# Patient Record
Sex: MALE | Race: WHITE | Employment: OTHER | URBAN - METROPOLITAN AREA
[De-identification: names, ages, dates, MRNs, and addresses within clinical notes are randomized per-mention and may not be internally consistent; named-entity substitution may affect disease eponyms.]

---

## 2017-01-01 ENCOUNTER — APPOINTMENT (OUTPATIENT)
Dept: CT IMAGING | Age: 77
DRG: 698 | End: 2017-01-01
Attending: EMERGENCY MEDICINE
Payer: MEDICARE

## 2017-01-01 ENCOUNTER — HOSPITAL ENCOUNTER (OUTPATIENT)
Dept: WOUND CARE | Age: 77
Discharge: HOME | End: 2017-05-15
Attending: PHYSICAL MEDICINE & REHABILITATION
Payer: MEDICARE

## 2017-01-01 ENCOUNTER — APPOINTMENT (OUTPATIENT)
Dept: INTERVENTIONAL RADIOLOGY/VASCULAR | Age: 77
DRG: 698 | End: 2017-01-01
Attending: INTERNAL MEDICINE
Payer: MEDICARE

## 2017-01-01 ENCOUNTER — HOME CARE VISIT (OUTPATIENT)
Dept: SCHEDULING | Facility: HOME HEALTH | Age: 77
End: 2017-01-01

## 2017-01-01 ENCOUNTER — APPOINTMENT (OUTPATIENT)
Dept: ULTRASOUND IMAGING | Age: 77
DRG: 698 | End: 2017-01-01
Attending: HOSPITALIST
Payer: MEDICARE

## 2017-01-01 ENCOUNTER — APPOINTMENT (OUTPATIENT)
Dept: GENERAL RADIOLOGY | Age: 77
DRG: 208 | End: 2017-01-01
Attending: INTERNAL MEDICINE
Payer: MEDICARE

## 2017-01-01 ENCOUNTER — APPOINTMENT (OUTPATIENT)
Dept: GENERAL RADIOLOGY | Age: 77
DRG: 208 | End: 2017-01-01
Attending: EMERGENCY MEDICINE
Payer: MEDICARE

## 2017-01-01 ENCOUNTER — HOSPITAL ENCOUNTER (INPATIENT)
Age: 77
LOS: 1 days | DRG: 208 | End: 2017-06-06
Attending: EMERGENCY MEDICINE | Admitting: INTERNAL MEDICINE
Payer: MEDICARE

## 2017-01-01 ENCOUNTER — HOSPITAL ENCOUNTER (INPATIENT)
Age: 77
LOS: 5 days | Discharge: HOME HEALTH | DRG: 698 | End: 2017-06-02
Attending: EMERGENCY MEDICINE | Admitting: HOSPITALIST
Payer: MEDICARE

## 2017-01-01 ENCOUNTER — APPOINTMENT (OUTPATIENT)
Dept: GENERAL RADIOLOGY | Age: 77
DRG: 698 | End: 2017-01-01
Attending: EMERGENCY MEDICINE
Payer: MEDICARE

## 2017-01-01 ENCOUNTER — HOME HEALTH ADMISSION (OUTPATIENT)
Dept: HOME HEALTH SERVICES | Facility: HOME HEALTH | Age: 77
End: 2017-01-01

## 2017-01-01 VITALS
OXYGEN SATURATION: 93 % | SYSTOLIC BLOOD PRESSURE: 129 MMHG | RESPIRATION RATE: 16 BRPM | TEMPERATURE: 97.5 F | HEIGHT: 71 IN | BODY MASS INDEX: 44.1 KG/M2 | DIASTOLIC BLOOD PRESSURE: 60 MMHG | WEIGHT: 315 LBS | HEART RATE: 76 BPM

## 2017-01-01 VITALS
BODY MASS INDEX: 44.1 KG/M2 | DIASTOLIC BLOOD PRESSURE: 49 MMHG | OXYGEN SATURATION: 97 % | RESPIRATION RATE: 22 BRPM | SYSTOLIC BLOOD PRESSURE: 78 MMHG | TEMPERATURE: 99.7 F | WEIGHT: 315 LBS | HEART RATE: 64 BPM | HEIGHT: 71 IN

## 2017-01-01 DIAGNOSIS — R65.21 SEVERE SEPSIS WITH SEPTIC SHOCK (HCC): ICD-10-CM

## 2017-01-01 DIAGNOSIS — E66.01 MORBID OBESITY DUE TO EXCESS CALORIES (HCC): ICD-10-CM

## 2017-01-01 DIAGNOSIS — J44.9 CHRONIC OBSTRUCTIVE PULMONARY DISEASE, UNSPECIFIED COPD TYPE (HCC): ICD-10-CM

## 2017-01-01 DIAGNOSIS — T17.920D: ICD-10-CM

## 2017-01-01 DIAGNOSIS — G93.1 ANOXIC BRAIN DAMAGE (HCC): ICD-10-CM

## 2017-01-01 DIAGNOSIS — R53.1 WEAKNESS: ICD-10-CM

## 2017-01-01 DIAGNOSIS — Z99.89 OSA ON CPAP: ICD-10-CM

## 2017-01-01 DIAGNOSIS — E11.22 TYPE 2 DIABETES MELLITUS WITH CHRONIC KIDNEY DISEASE ON CHRONIC DIALYSIS, UNSPECIFIED LONG TERM INSULIN USE STATUS: ICD-10-CM

## 2017-01-01 DIAGNOSIS — G93.40 ACUTE ENCEPHALOPATHY: ICD-10-CM

## 2017-01-01 DIAGNOSIS — I46.9 CARDIAC ARREST (HCC): ICD-10-CM

## 2017-01-01 DIAGNOSIS — A41.9 SEVERE SEPSIS WITH SEPTIC SHOCK (HCC): ICD-10-CM

## 2017-01-01 DIAGNOSIS — G47.33 OSA ON CPAP: ICD-10-CM

## 2017-01-01 DIAGNOSIS — J96.02 ACUTE HYPERCAPNIC RESPIRATORY FAILURE (HCC): ICD-10-CM

## 2017-01-01 DIAGNOSIS — N18.6 TYPE 2 DIABETES MELLITUS WITH CHRONIC KIDNEY DISEASE ON CHRONIC DIALYSIS, UNSPECIFIED LONG TERM INSULIN USE STATUS: ICD-10-CM

## 2017-01-01 DIAGNOSIS — T17.900D: ICD-10-CM

## 2017-01-01 DIAGNOSIS — Z99.2 TYPE 2 DIABETES MELLITUS WITH CHRONIC KIDNEY DISEASE ON CHRONIC DIALYSIS, UNSPECIFIED LONG TERM INSULIN USE STATUS: ICD-10-CM

## 2017-01-01 DIAGNOSIS — N18.6 ESRD ON HEMODIALYSIS (HCC): ICD-10-CM

## 2017-01-01 DIAGNOSIS — Z99.2 ESRD ON HEMODIALYSIS (HCC): ICD-10-CM

## 2017-01-01 DIAGNOSIS — E87.5 ACUTE HYPERKALEMIA: ICD-10-CM

## 2017-01-01 DIAGNOSIS — N17.9 ACUTE RENAL FAILURE, UNSPECIFIED ACUTE RENAL FAILURE TYPE (HCC): Primary | ICD-10-CM

## 2017-01-01 DIAGNOSIS — T17.920A: ICD-10-CM

## 2017-01-01 DIAGNOSIS — R09.2: Primary | ICD-10-CM

## 2017-01-01 LAB
ALBUMIN SERPL BCP-MCNC: 2.3 G/DL (ref 3.2–4.6)
ALBUMIN SERPL BCP-MCNC: 2.3 G/DL (ref 3.2–4.6)
ALBUMIN SERPL BCP-MCNC: 2.6 G/DL (ref 3.2–4.6)
ALBUMIN SERPL BCP-MCNC: 2.8 G/DL (ref 3.2–4.6)
ALBUMIN/GLOB SERPL: 0.5 {RATIO} (ref 1.2–3.5)
ALBUMIN/GLOB SERPL: 0.5 {RATIO} (ref 1.2–3.5)
ALBUMIN/GLOB SERPL: 0.6 {RATIO} (ref 1.2–3.5)
ALP SERPL-CCNC: 107 U/L (ref 50–136)
ALP SERPL-CCNC: 79 U/L (ref 50–136)
ALP SERPL-CCNC: 91 U/L (ref 50–136)
ALT SERPL-CCNC: 23 U/L (ref 12–65)
ALT SERPL-CCNC: 28 U/L (ref 12–65)
ALT SERPL-CCNC: 30 U/L (ref 12–65)
AMMONIA PLAS-SCNC: 24 UMOL/L (ref 11–32)
AMORPH CRY URNS QL MICRO: ABNORMAL
ANION GAP BLD CALC-SCNC: 12 MMOL/L (ref 7–16)
ANION GAP BLD CALC-SCNC: 13 MMOL/L (ref 7–16)
ANION GAP BLD CALC-SCNC: 14 MMOL/L (ref 7–16)
ANION GAP BLD CALC-SCNC: 14 MMOL/L (ref 7–16)
ANION GAP BLD CALC-SCNC: 16 MMOL/L (ref 7–16)
ANION GAP BLD CALC-SCNC: 9 MMOL/L (ref 7–16)
APPEARANCE UR: ABNORMAL
ARTERIAL PATENCY WRIST A: ABNORMAL
ARTERIAL PATENCY WRIST A: ABNORMAL
ARTERIAL PATENCY WRIST A: POSITIVE
AST SERPL W P-5'-P-CCNC: 10 U/L (ref 15–37)
AST SERPL W P-5'-P-CCNC: 25 U/L (ref 15–37)
AST SERPL W P-5'-P-CCNC: 42 U/L (ref 15–37)
ATRIAL RATE: 105 BPM
ATRIAL RATE: 312 BPM
BACTERIA SPEC CULT: NORMAL
BACTERIA URNS QL MICRO: 0 /HPF
BASE DEFICIT BLDA-SCNC: 2.2 MMOL/L (ref 0–2)
BASE DEFICIT BLDA-SCNC: 3.3 MMOL/L (ref 0–2)
BASE DEFICIT BLDA-SCNC: 5.9 MMOL/L (ref 0–2)
BASOPHILS # BLD AUTO: 0 K/UL (ref 0–0.2)
BASOPHILS # BLD AUTO: 0 K/UL (ref 0–0.2)
BASOPHILS # BLD AUTO: 0.2 K/UL (ref 0–0.2)
BASOPHILS # BLD: 0 % (ref 0–2)
BASOPHILS # BLD: 0 % (ref 0–2)
BASOPHILS NFR BLD MANUAL: 1 % (ref 0–2)
BDY SITE: ABNORMAL
BILIRUB SERPL-MCNC: 0.3 MG/DL (ref 0.2–1.1)
BILIRUB SERPL-MCNC: 0.4 MG/DL (ref 0.2–1.1)
BILIRUB SERPL-MCNC: 0.5 MG/DL (ref 0.2–1.1)
BILIRUB UR QL: NEGATIVE
BNP SERPL-MCNC: 422 PG/ML
BUN SERPL-MCNC: 114 MG/DL (ref 8–23)
BUN SERPL-MCNC: 135 MG/DL (ref 8–23)
BUN SERPL-MCNC: 139 MG/DL (ref 8–23)
BUN SERPL-MCNC: 41 MG/DL (ref 8–23)
BUN SERPL-MCNC: 43 MG/DL (ref 8–23)
BUN SERPL-MCNC: 49 MG/DL (ref 8–23)
CA-I BLD-SCNC: 1.12 MMOL/L (ref 1–1.3)
CALCIUM SERPL-MCNC: 7.4 MG/DL (ref 8.3–10.4)
CALCIUM SERPL-MCNC: 7.4 MG/DL (ref 8.3–10.4)
CALCIUM SERPL-MCNC: 7.6 MG/DL (ref 8.3–10.4)
CALCIUM SERPL-MCNC: 8.2 MG/DL (ref 8.3–10.4)
CALCIUM SERPL-MCNC: 8.3 MG/DL (ref 8.3–10.4)
CALCIUM SERPL-MCNC: 8.4 MG/DL (ref 8.3–10.4)
CALCULATED R AXIS, ECG10: 28 DEGREES
CALCULATED R AXIS, ECG10: 9 DEGREES
CALCULATED T AXIS, ECG11: -133 DEGREES
CALCULATED T AXIS, ECG11: -152 DEGREES
CHLORIDE BLDA-SCNC: 93 MMOL/L (ref 98–106)
CHLORIDE SERPL-SCNC: 92 MMOL/L (ref 98–107)
CHLORIDE SERPL-SCNC: 93 MMOL/L (ref 98–107)
CHLORIDE SERPL-SCNC: 94 MMOL/L (ref 98–107)
CHLORIDE SERPL-SCNC: 95 MMOL/L (ref 98–107)
CHLORIDE SERPL-SCNC: 95 MMOL/L (ref 98–107)
CHLORIDE SERPL-SCNC: 98 MMOL/L (ref 98–107)
CO2 SERPL-SCNC: 23 MMOL/L (ref 21–32)
CO2 SERPL-SCNC: 25 MMOL/L (ref 21–32)
CO2 SERPL-SCNC: 26 MMOL/L (ref 21–32)
CO2 SERPL-SCNC: 26 MMOL/L (ref 21–32)
CO2 SERPL-SCNC: 27 MMOL/L (ref 21–32)
CO2 SERPL-SCNC: 29 MMOL/L (ref 21–32)
COHGB MFR BLD: 0.4 % (ref 0.5–1.5)
COHGB MFR BLD: 0.5 % (ref 0.5–1.5)
COHGB MFR BLD: 0.9 % (ref 0.5–1.5)
COLOR UR: YELLOW
CREAT SERPL-MCNC: 3.81 MG/DL (ref 0.8–1.5)
CREAT SERPL-MCNC: 4.38 MG/DL (ref 0.8–1.5)
CREAT SERPL-MCNC: 4.45 MG/DL (ref 0.8–1.5)
CREAT SERPL-MCNC: 6.92 MG/DL (ref 0.8–1.5)
CREAT SERPL-MCNC: 8.18 MG/DL (ref 0.8–1.5)
CREAT SERPL-MCNC: 8.25 MG/DL (ref 0.8–1.5)
DIAGNOSIS, 93000: NORMAL
DIAGNOSIS, 93000: NORMAL
DIFFERENTIAL METHOD BLD: ABNORMAL
DO-HGB BLD-MCNC: 1 % (ref 0–5)
DO-HGB BLD-MCNC: 1 % (ref 0–5)
DO-HGB BLD-MCNC: 6 % (ref 0–5)
EOSINOPHIL # BLD: 0 K/UL (ref 0–0.8)
EOSINOPHIL # BLD: 0.1 K/UL (ref 0–0.8)
EOSINOPHIL # BLD: 0.2 K/UL (ref 0–0.8)
EOSINOPHIL NFR BLD MANUAL: 1 % (ref 1–8)
EOSINOPHIL NFR BLD: 0 % (ref 0.5–7.8)
EOSINOPHIL NFR BLD: 1 % (ref 0.5–7.8)
ERYTHROCYTE [DISTWIDTH] IN BLOOD BY AUTOMATED COUNT: 13.4 % (ref 11.9–14.6)
ERYTHROCYTE [DISTWIDTH] IN BLOOD BY AUTOMATED COUNT: 13.5 % (ref 11.9–14.6)
ERYTHROCYTE [DISTWIDTH] IN BLOOD BY AUTOMATED COUNT: 14.1 % (ref 11.9–14.6)
ERYTHROCYTE [DISTWIDTH] IN BLOOD BY AUTOMATED COUNT: 14.2 % (ref 11.9–14.6)
FERRITIN SERPL-MCNC: 175 NG/ML (ref 8–388)
FIO2 ON VENT: 100 %
FIO2 ON VENT: 100 %
FIO2 ON VENT: 35 %
GLOBULIN SER CALC-MCNC: 4.3 G/DL (ref 2.3–3.5)
GLOBULIN SER CALC-MCNC: 4.5 G/DL (ref 2.3–3.5)
GLOBULIN SER CALC-MCNC: 4.6 G/DL (ref 2.3–3.5)
GLUCOSE BLD STRIP.AUTO-MCNC: 116 MG/DL (ref 65–100)
GLUCOSE BLD STRIP.AUTO-MCNC: 130 MG/DL (ref 65–100)
GLUCOSE BLD STRIP.AUTO-MCNC: 139 MG/DL (ref 65–100)
GLUCOSE BLD STRIP.AUTO-MCNC: 143 MG/DL (ref 65–100)
GLUCOSE BLD STRIP.AUTO-MCNC: 144 MG/DL (ref 65–100)
GLUCOSE BLD STRIP.AUTO-MCNC: 149 MG/DL (ref 65–100)
GLUCOSE BLD STRIP.AUTO-MCNC: 156 MG/DL (ref 65–100)
GLUCOSE BLD STRIP.AUTO-MCNC: 158 MG/DL (ref 65–100)
GLUCOSE BLD STRIP.AUTO-MCNC: 159 MG/DL (ref 65–100)
GLUCOSE BLD STRIP.AUTO-MCNC: 177 MG/DL (ref 65–100)
GLUCOSE BLD STRIP.AUTO-MCNC: 184 MG/DL (ref 65–100)
GLUCOSE BLD STRIP.AUTO-MCNC: 184 MG/DL (ref 65–100)
GLUCOSE BLD STRIP.AUTO-MCNC: 185 MG/DL (ref 65–100)
GLUCOSE BLD STRIP.AUTO-MCNC: 192 MG/DL (ref 65–100)
GLUCOSE BLD STRIP.AUTO-MCNC: 192 MG/DL (ref 65–100)
GLUCOSE BLD STRIP.AUTO-MCNC: 193 MG/DL (ref 65–100)
GLUCOSE BLD STRIP.AUTO-MCNC: 197 MG/DL (ref 65–100)
GLUCOSE BLD STRIP.AUTO-MCNC: 202 MG/DL (ref 65–100)
GLUCOSE BLD STRIP.AUTO-MCNC: 209 MG/DL (ref 65–100)
GLUCOSE BLD STRIP.AUTO-MCNC: 210 MG/DL (ref 65–100)
GLUCOSE BLD STRIP.AUTO-MCNC: 239 MG/DL (ref 65–100)
GLUCOSE BLD STRIP.AUTO-MCNC: 243 MG/DL (ref 65–100)
GLUCOSE BLD STRIP.AUTO-MCNC: 247 MG/DL (ref 65–100)
GLUCOSE BLD STRIP.AUTO-MCNC: 266 MG/DL (ref 65–100)
GLUCOSE BLD STRIP.AUTO-MCNC: 317 MG/DL (ref 65–100)
GLUCOSE SERPL-MCNC: 107 MG/DL (ref 65–100)
GLUCOSE SERPL-MCNC: 132 MG/DL (ref 65–100)
GLUCOSE SERPL-MCNC: 170 MG/DL (ref 65–100)
GLUCOSE SERPL-MCNC: 187 MG/DL (ref 65–100)
GLUCOSE SERPL-MCNC: 205 MG/DL (ref 65–100)
GLUCOSE SERPL-MCNC: 218 MG/DL (ref 65–100)
GLUCOSE UR STRIP.AUTO-MCNC: 100 MG/DL
HBA1C MFR BLD: 6.3 %
HBV SURFACE AG SERPL QL IA: NEGATIVE
HCO3 BLDA-SCNC: 22 MMOL/L (ref 22–26)
HCO3 BLDA-SCNC: 23 MMOL/L (ref 22–26)
HCO3 BLDA-SCNC: 24 MMOL/L (ref 22–26)
HCT VFR BLD AUTO: 24.4 % (ref 41.1–50.3)
HCT VFR BLD AUTO: 25 % (ref 41.1–50.3)
HCT VFR BLD AUTO: 25.2 % (ref 41.1–50.3)
HCT VFR BLD AUTO: 25.7 % (ref 41.1–50.3)
HCT VFR BLD AUTO: 25.9 % (ref 41.1–50.3)
HGB BLD-MCNC: 7.6 G/DL (ref 13.6–17.2)
HGB BLD-MCNC: 7.8 G/DL (ref 13.6–17.2)
HGB BLD-MCNC: 7.9 G/DL (ref 13.6–17.2)
HGB BLD-MCNC: 8 G/DL (ref 13.6–17.2)
HGB BLD-MCNC: 8.2 G/DL (ref 13.6–17.2)
HGB BLDMV-MCNC: 8.2 GM/DL (ref 11.7–15)
HGB BLDMV-MCNC: 8.5 GM/DL (ref 11.7–15)
HGB BLDMV-MCNC: 8.7 GM/DL (ref 11.7–15)
HGB UR QL STRIP: NEGATIVE
IMM GRANULOCYTES # BLD: 0 K/UL (ref 0–0.5)
IMM GRANULOCYTES # BLD: 0.3 K/UL (ref 0–0.5)
IMM GRANULOCYTES NFR BLD AUTO: 0.6 % (ref 0–5)
IMM GRANULOCYTES NFR BLD AUTO: 1.3 % (ref 0–5)
IRON SATN MFR SERPL: 22 %
IRON SERPL-MCNC: 42 UG/DL (ref 35–150)
KETONES UR QL STRIP.AUTO: NEGATIVE MG/DL
LACTATE BLD-SCNC: 1.7 MMOL/L (ref 0.5–1.9)
LACTATE BLD-SCNC: 5.7 MMOL/L (ref 0.5–1.9)
LACTATE SERPL-SCNC: 1.3 MMOL/L (ref 0.4–2)
LEUKOCYTE ESTERASE UR QL STRIP.AUTO: ABNORMAL
LIPASE SERPL-CCNC: 101 U/L (ref 73–393)
LYMPHOCYTES # BLD AUTO: 5 % (ref 13–44)
LYMPHOCYTES # BLD AUTO: 9 % (ref 13–44)
LYMPHOCYTES # BLD: 0.6 K/UL (ref 0.5–4.6)
LYMPHOCYTES # BLD: 1.3 K/UL (ref 0.5–4.6)
LYMPHOCYTES # BLD: 4.1 K/UL (ref 0.5–4.6)
LYMPHOCYTES NFR BLD MANUAL: 18 % (ref 16–44)
MAGNESIUM SERPL-MCNC: 2.1 MG/DL (ref 1.8–2.4)
MAGNESIUM SERPL-MCNC: 2.3 MG/DL (ref 1.8–2.4)
MCH RBC QN AUTO: 29.9 PG (ref 26.1–32.9)
MCH RBC QN AUTO: 29.9 PG (ref 26.1–32.9)
MCH RBC QN AUTO: 30 PG (ref 26.1–32.9)
MCH RBC QN AUTO: 30.3 PG (ref 26.1–32.9)
MCHC RBC AUTO-ENTMCNC: 30.7 G/DL (ref 31.4–35)
MCHC RBC AUTO-ENTMCNC: 31.1 G/DL (ref 31.4–35)
MCHC RBC AUTO-ENTMCNC: 31.2 G/DL (ref 31.4–35)
MCHC RBC AUTO-ENTMCNC: 31.7 G/DL (ref 31.4–35)
MCV RBC AUTO: 94.9 FL (ref 79.6–97.8)
MCV RBC AUTO: 95.8 FL (ref 79.6–97.8)
MCV RBC AUTO: 96.1 FL (ref 79.6–97.8)
MCV RBC AUTO: 98.5 FL (ref 79.6–97.8)
METHGB MFR BLD: 0.3 % (ref 0–1.5)
METHGB MFR BLD: 0.7 % (ref 0–1.5)
METHGB MFR BLD: 0.7 % (ref 0–1.5)
MM INDURATION POC: NORMAL 0MM (ref 0–5)
MM INDURATION POC: NORMAL MM (ref 0–5)
MM INDURATION POC: NORMAL MM (ref 0–5)
MONOCYTES # BLD: 0.6 K/UL (ref 0.1–1.3)
MONOCYTES # BLD: 1.7 K/UL (ref 0.1–1.3)
MONOCYTES # BLD: 2 K/UL (ref 0.1–1.3)
MONOCYTES NFR BLD AUTO: 6 % (ref 4–12)
MONOCYTES NFR BLD AUTO: 9 % (ref 4–12)
MONOCYTES NFR BLD MANUAL: 9 % (ref 3–9)
NEUTS SEG # BLD: 16.2 K/UL (ref 1.7–8.2)
NEUTS SEG # BLD: 22.9 K/UL (ref 1.7–8.2)
NEUTS SEG # BLD: 5.5 K/UL (ref 1.7–8.2)
NEUTS SEG NFR BLD AUTO: 80 % (ref 43–78)
NEUTS SEG NFR BLD AUTO: 88 % (ref 43–78)
NEUTS SEG NFR BLD MANUAL: 71 % (ref 47–75)
NITRITE UR QL STRIP.AUTO: NEGATIVE
OXYHGB MFR BLDA: 92.4 % (ref 94–97)
OXYHGB MFR BLDA: 97.8 % (ref 94–97)
OXYHGB MFR BLDA: 98 % (ref 94–97)
PCO2 BLDA: 45 MMHG (ref 35–45)
PCO2 BLDA: 47 MMHG (ref 35–45)
PCO2 BLDA: 56 MMHG (ref 35–45)
PEEP RESPIRATORY: 10 CM[H2O]
PEEP RESPIRATORY: 10 CM[H2O]
PEEP RESPIRATORY: 8 CM[H2O]
PH BLDA: 7.21 [PH] (ref 7.35–7.45)
PH BLDA: 7.32 [PH] (ref 7.35–7.45)
PH BLDA: 7.33 [PH] (ref 7.35–7.45)
PH UR STRIP: 5 [PH] (ref 5–9)
PHOSPHATE SERPL-MCNC: 4 MG/DL (ref 2.3–3.7)
PHOSPHATE SERPL-MCNC: 4.7 MG/DL (ref 2.3–3.7)
PHOSPHATE SERPL-MCNC: 8.7 MG/DL (ref 2.3–3.7)
PLATELET # BLD AUTO: 212 K/UL (ref 150–450)
PLATELET # BLD AUTO: 224 K/UL (ref 150–450)
PLATELET # BLD AUTO: 240 K/UL (ref 150–450)
PLATELET # BLD AUTO: 266 K/UL (ref 150–450)
PLATELET COMMENTS,PCOM: ADEQUATE
PMV BLD AUTO: 9.4 FL (ref 10.8–14.1)
PMV BLD AUTO: 9.5 FL (ref 10.8–14.1)
PMV BLD AUTO: 9.5 FL (ref 10.8–14.1)
PMV BLD AUTO: 9.9 FL (ref 10.8–14.1)
PO2 BLDA: 184 MMHG (ref 75–100)
PO2 BLDA: 210 MMHG (ref 75–100)
PO2 BLDA: 75 MMHG (ref 75–100)
POTASSIUM BLDA-SCNC: 3.65 MMOL/L (ref 3.5–5.3)
POTASSIUM SERPL-SCNC: 3.8 MMOL/L (ref 3.5–5.1)
POTASSIUM SERPL-SCNC: 4 MMOL/L (ref 3.5–5.1)
POTASSIUM SERPL-SCNC: 4 MMOL/L (ref 3.5–5.1)
POTASSIUM SERPL-SCNC: 4.8 MMOL/L (ref 3.5–5.1)
POTASSIUM SERPL-SCNC: 5 MMOL/L (ref 3.5–5.1)
POTASSIUM SERPL-SCNC: 5.3 MMOL/L (ref 3.5–5.1)
PPD POC: NORMAL NEGATIVE
PROT SERPL-MCNC: 6.6 G/DL (ref 6.3–8.2)
PROT SERPL-MCNC: 6.8 G/DL (ref 6.3–8.2)
PROT SERPL-MCNC: 7.4 G/DL (ref 6.3–8.2)
PROT UR STRIP-MCNC: 100 MG/DL
Q-T INTERVAL, ECG07: 332 MS
Q-T INTERVAL, ECG07: 482 MS
QRS DURATION, ECG06: 102 MS
QRS DURATION, ECG06: 110 MS
QTC CALCULATION (BEZET), ECG08: 392 MS
QTC CALCULATION (BEZET), ECG08: 505 MS
RBC # BLD AUTO: 2.54 M/UL (ref 4.23–5.67)
RBC # BLD AUTO: 2.61 M/UL (ref 4.23–5.67)
RBC # BLD AUTO: 2.61 M/UL (ref 4.23–5.67)
RBC # BLD AUTO: 2.73 M/UL (ref 4.23–5.67)
RBC MORPH BLD: ABNORMAL
RESP RATE: 18
RESP RATE: 20
RESP RATE: 20
SAO2 % BLD: 94 % (ref 92–98.5)
SAO2 % BLD: 99 % (ref 92–98.5)
SAO2 % BLD: 99 % (ref 92–98.5)
SERVICE CMNT-IMP: ABNORMAL
SERVICE CMNT-IMP: ABNORMAL
SERVICE CMNT-IMP: NORMAL
SODIUM BLDA-SCNC: 129.5 MMOL/L (ref 135–148)
SODIUM SERPL-SCNC: 132 MMOL/L (ref 136–145)
SODIUM SERPL-SCNC: 132 MMOL/L (ref 136–145)
SODIUM SERPL-SCNC: 133 MMOL/L (ref 136–145)
SODIUM SERPL-SCNC: 134 MMOL/L (ref 136–145)
SODIUM SERPL-SCNC: 134 MMOL/L (ref 136–145)
SODIUM SERPL-SCNC: 136 MMOL/L (ref 136–145)
SP GR UR REFRACTOMETRY: 1.01 (ref 1–1.02)
TIBC SERPL-MCNC: 187 UG/DL (ref 250–450)
TROPONIN I BLD-MCNC: 0.04 NG/ML (ref 0–0.08)
TROPONIN I BLD-MCNC: 0.05 NG/ML (ref 0–0.08)
UROBILINOGEN UR QL STRIP.AUTO: 0.2 EU/DL (ref 0.2–1)
VENTILATION MODE VENT: ABNORMAL
VENTRICULAR RATE, ECG03: 66 BPM
VENTRICULAR RATE, ECG03: 84 BPM
VT SETTING VENT: 500 ML
VT SETTING VENT: 500 ML
WBC # BLD AUTO: 22.7 K/UL (ref 4.3–11.1)
WBC # BLD AUTO: 26.2 K/UL (ref 4.3–11.1)
WBC # BLD AUTO: 5.7 K/UL (ref 4.3–11.1)
WBC # BLD AUTO: 6.8 K/UL (ref 4.3–11.1)
WBC MORPH BLD: ABNORMAL
WBC URNS QL MICRO: ABNORMAL /HPF

## 2017-01-01 PROCEDURE — 74011250636 HC RX REV CODE- 250/636: Performed by: HOSPITALIST

## 2017-01-01 PROCEDURE — 36415 COLL VENOUS BLD VENIPUNCTURE: CPT | Performed by: HOSPITALIST

## 2017-01-01 PROCEDURE — 82728 ASSAY OF FERRITIN: CPT | Performed by: INTERNAL MEDICINE

## 2017-01-01 PROCEDURE — 74011250637 HC RX REV CODE- 250/637: Performed by: INTERNAL MEDICINE

## 2017-01-01 PROCEDURE — 94640 AIRWAY INHALATION TREATMENT: CPT

## 2017-01-01 PROCEDURE — 74011636637 HC RX REV CODE- 636/637: Performed by: HOSPITALIST

## 2017-01-01 PROCEDURE — 82803 BLOOD GASES ANY COMBINATION: CPT

## 2017-01-01 PROCEDURE — 90935 HEMODIALYSIS ONE EVALUATION: CPT

## 2017-01-01 PROCEDURE — 84484 ASSAY OF TROPONIN QUANT: CPT

## 2017-01-01 PROCEDURE — 74011250636 HC RX REV CODE- 250/636: Performed by: NURSE PRACTITIONER

## 2017-01-01 PROCEDURE — 71010 XR CHEST PORT: CPT

## 2017-01-01 PROCEDURE — 80053 COMPREHEN METABOLIC PANEL: CPT | Performed by: EMERGENCY MEDICINE

## 2017-01-01 PROCEDURE — 74011636637 HC RX REV CODE- 636/637: Performed by: INTERNAL MEDICINE

## 2017-01-01 PROCEDURE — 74011000250 HC RX REV CODE- 250: Performed by: NURSE PRACTITIONER

## 2017-01-01 PROCEDURE — 77010033678 HC OXYGEN DAILY

## 2017-01-01 PROCEDURE — 74011250637 HC RX REV CODE- 250/637: Performed by: HOSPITALIST

## 2017-01-01 PROCEDURE — 85018 HEMOGLOBIN: CPT | Performed by: INTERNAL MEDICINE

## 2017-01-01 PROCEDURE — 36415 COLL VENOUS BLD VENIPUNCTURE: CPT | Performed by: INTERNAL MEDICINE

## 2017-01-01 PROCEDURE — C1894 INTRO/SHEATH, NON-LASER: HCPCS

## 2017-01-01 PROCEDURE — 99239 HOSP IP/OBS DSCHRG MGMT >30: CPT | Performed by: INTERNAL MEDICINE

## 2017-01-01 PROCEDURE — C1750 CATH, HEMODIALYSIS,LONG-TERM: HCPCS

## 2017-01-01 PROCEDURE — 74011000250 HC RX REV CODE- 250: Performed by: INTERNAL MEDICINE

## 2017-01-01 PROCEDURE — 83735 ASSAY OF MAGNESIUM: CPT | Performed by: EMERGENCY MEDICINE

## 2017-01-01 PROCEDURE — 82962 GLUCOSE BLOOD TEST: CPT

## 2017-01-01 PROCEDURE — 5A1D60Z PERFORMANCE OF URINARY FILTRATION, MULTIPLE: ICD-10-PCS | Performed by: INTERNAL MEDICINE

## 2017-01-01 PROCEDURE — 0T9B70Z DRAINAGE OF BLADDER WITH DRAINAGE DEVICE, VIA NATURAL OR ARTIFICIAL OPENING: ICD-10-PCS | Performed by: EMERGENCY MEDICINE

## 2017-01-01 PROCEDURE — 83880 ASSAY OF NATRIURETIC PEPTIDE: CPT | Performed by: EMERGENCY MEDICINE

## 2017-01-01 PROCEDURE — 94003 VENT MGMT INPAT SUBQ DAY: CPT

## 2017-01-01 PROCEDURE — 94002 VENT MGMT INPAT INIT DAY: CPT

## 2017-01-01 PROCEDURE — 97530 THERAPEUTIC ACTIVITIES: CPT

## 2017-01-01 PROCEDURE — 82140 ASSAY OF AMMONIA: CPT

## 2017-01-01 PROCEDURE — 65270000029 HC RM PRIVATE

## 2017-01-01 PROCEDURE — 87641 MR-STAPH DNA AMP PROBE: CPT | Performed by: INTERNAL MEDICINE

## 2017-01-01 PROCEDURE — 77030010507 HC ADH SKN DERMBND J&J -B

## 2017-01-01 PROCEDURE — 74011250636 HC RX REV CODE- 250/636

## 2017-01-01 PROCEDURE — 81001 URINALYSIS AUTO W/SCOPE: CPT | Performed by: EMERGENCY MEDICINE

## 2017-01-01 PROCEDURE — 36600 WITHDRAWAL OF ARTERIAL BLOOD: CPT

## 2017-01-01 PROCEDURE — C1769 GUIDE WIRE: HCPCS

## 2017-01-01 PROCEDURE — 94760 N-INVAS EAR/PLS OXIMETRY 1: CPT

## 2017-01-01 PROCEDURE — 74011000258 HC RX REV CODE- 258: Performed by: INTERNAL MEDICINE

## 2017-01-01 PROCEDURE — 93005 ELECTROCARDIOGRAM TRACING: CPT | Performed by: EMERGENCY MEDICINE

## 2017-01-01 PROCEDURE — 31500 INSERT EMERGENCY AIRWAY: CPT | Performed by: INTERNAL MEDICINE

## 2017-01-01 PROCEDURE — 76770 US EXAM ABDO BACK WALL COMP: CPT

## 2017-01-01 PROCEDURE — 77030002916 HC SUT ETHLN J&J -A

## 2017-01-01 PROCEDURE — 97162 PT EVAL MOD COMPLEX 30 MIN: CPT

## 2017-01-01 PROCEDURE — 77030031476 HC EXCH HEAT MOISTW FLTR HALY -A

## 2017-01-01 PROCEDURE — 85025 COMPLETE CBC W/AUTO DIFF WBC: CPT | Performed by: EMERGENCY MEDICINE

## 2017-01-01 PROCEDURE — 77030005518 HC CATH URETH FOL 2W BARD -B: Performed by: EMERGENCY MEDICINE

## 2017-01-01 PROCEDURE — 74011000250 HC RX REV CODE- 250: Performed by: HOSPITALIST

## 2017-01-01 PROCEDURE — 02H633Z INSERTION OF INFUSION DEVICE INTO RIGHT ATRIUM, PERCUTANEOUS APPROACH: ICD-10-PCS | Performed by: RADIOLOGY

## 2017-01-01 PROCEDURE — 99215 OFFICE O/P EST HI 40 MIN: CPT

## 2017-01-01 PROCEDURE — 99291 CRITICAL CARE FIRST HOUR: CPT | Performed by: INTERNAL MEDICINE

## 2017-01-01 PROCEDURE — 74011000302 HC RX REV CODE- 302: Performed by: INTERNAL MEDICINE

## 2017-01-01 PROCEDURE — 83605 ASSAY OF LACTIC ACID: CPT

## 2017-01-01 PROCEDURE — 36591 DRAW BLOOD OFF VENOUS DEVICE: CPT

## 2017-01-01 PROCEDURE — 5A1945Z RESPIRATORY VENTILATION, 24-96 CONSECUTIVE HOURS: ICD-10-PCS | Performed by: INTERNAL MEDICINE

## 2017-01-01 PROCEDURE — 51702 INSERT TEMP BLADDER CATH: CPT | Performed by: EMERGENCY MEDICINE

## 2017-01-01 PROCEDURE — 92950 HEART/LUNG RESUSCITATION CPR: CPT | Performed by: EMERGENCY MEDICINE

## 2017-01-01 PROCEDURE — 83036 HEMOGLOBIN GLYCOSYLATED A1C: CPT

## 2017-01-01 PROCEDURE — 74011250636 HC RX REV CODE- 250/636: Performed by: EMERGENCY MEDICINE

## 2017-01-01 PROCEDURE — 73630 X-RAY EXAM OF FOOT: CPT

## 2017-01-01 PROCEDURE — 74011250636 HC RX REV CODE- 250/636: Performed by: INTERNAL MEDICINE

## 2017-01-01 PROCEDURE — 77030005510 HC CATH URETH FOL M BARD -A

## 2017-01-01 PROCEDURE — 76450000000

## 2017-01-01 PROCEDURE — 65610000001 HC ROOM ICU GENERAL

## 2017-01-01 PROCEDURE — 70450 CT HEAD/BRAIN W/O DYE: CPT

## 2017-01-01 PROCEDURE — 76937 US GUIDE VASCULAR ACCESS: CPT

## 2017-01-01 PROCEDURE — 84100 ASSAY OF PHOSPHORUS: CPT | Performed by: INTERNAL MEDICINE

## 2017-01-01 PROCEDURE — 96366 THER/PROPH/DIAG IV INF ADDON: CPT | Performed by: EMERGENCY MEDICINE

## 2017-01-01 PROCEDURE — 86580 TB INTRADERMAL TEST: CPT | Performed by: INTERNAL MEDICINE

## 2017-01-01 PROCEDURE — 96360 HYDRATION IV INFUSION INIT: CPT | Performed by: EMERGENCY MEDICINE

## 2017-01-01 PROCEDURE — 0BH17EZ INSERTION OF ENDOTRACHEAL AIRWAY INTO TRACHEA, VIA NATURAL OR ARTIFICIAL OPENING: ICD-10-PCS | Performed by: INTERNAL MEDICINE

## 2017-01-01 PROCEDURE — 31645 BRNCHSC W/THER ASPIR 1ST: CPT | Performed by: INTERNAL MEDICINE

## 2017-01-01 PROCEDURE — 0BC38ZZ EXTIRPATION OF MATTER FROM RIGHT MAIN BRONCHUS, VIA NATURAL OR ARTIFICIAL OPENING ENDOSCOPIC: ICD-10-PCS | Performed by: INTERNAL MEDICINE

## 2017-01-01 PROCEDURE — 74011000250 HC RX REV CODE- 250: Performed by: RADIOLOGY

## 2017-01-01 PROCEDURE — 74176 CT ABD & PELVIS W/O CONTRAST: CPT

## 2017-01-01 PROCEDURE — 96365 THER/PROPH/DIAG IV INF INIT: CPT | Performed by: EMERGENCY MEDICINE

## 2017-01-01 PROCEDURE — 31500 INSERT EMERGENCY AIRWAY: CPT | Performed by: EMERGENCY MEDICINE

## 2017-01-01 PROCEDURE — 85025 COMPLETE CBC W/AUTO DIFF WBC: CPT | Performed by: INTERNAL MEDICINE

## 2017-01-01 PROCEDURE — C9113 INJ PANTOPRAZOLE SODIUM, VIA: HCPCS | Performed by: INTERNAL MEDICINE

## 2017-01-01 PROCEDURE — 74011250636 HC RX REV CODE- 250/636: Performed by: RADIOLOGY

## 2017-01-01 PROCEDURE — 71010 XR CHEST SNGL V: CPT

## 2017-01-01 PROCEDURE — 83540 ASSAY OF IRON: CPT | Performed by: INTERNAL MEDICINE

## 2017-01-01 PROCEDURE — 87340 HEPATITIS B SURFACE AG IA: CPT | Performed by: INTERNAL MEDICINE

## 2017-01-01 PROCEDURE — 80053 COMPREHEN METABOLIC PANEL: CPT | Performed by: INTERNAL MEDICINE

## 2017-01-01 PROCEDURE — 99285 EMERGENCY DEPT VISIT HI MDM: CPT | Performed by: EMERGENCY MEDICINE

## 2017-01-01 PROCEDURE — 99213 OFFICE O/P EST LOW 20 MIN: CPT

## 2017-01-01 PROCEDURE — 85027 COMPLETE CBC AUTOMATED: CPT | Performed by: HOSPITALIST

## 2017-01-01 PROCEDURE — 80069 RENAL FUNCTION PANEL: CPT | Performed by: HOSPITALIST

## 2017-01-01 PROCEDURE — 83690 ASSAY OF LIPASE: CPT | Performed by: EMERGENCY MEDICINE

## 2017-01-01 PROCEDURE — 83605 ASSAY OF LACTIC ACID: CPT | Performed by: INTERNAL MEDICINE

## 2017-01-01 PROCEDURE — 80048 BASIC METABOLIC PNL TOTAL CA: CPT | Performed by: INTERNAL MEDICINE

## 2017-01-01 PROCEDURE — 74011000250 HC RX REV CODE- 250

## 2017-01-01 PROCEDURE — 0BC18ZZ EXTIRPATION OF MATTER FROM TRACHEA, VIA NATURAL OR ARTIFICIAL OPENING ENDOSCOPIC: ICD-10-PCS | Performed by: INTERNAL MEDICINE

## 2017-01-01 PROCEDURE — 77030018719 HC DRSG PTCH ANTIMIC J&J -A

## 2017-01-01 PROCEDURE — 0BC78ZZ EXTIRPATION OF MATTER FROM LEFT MAIN BRONCHUS, VIA NATURAL OR ARTIFICIAL OPENING ENDOSCOPIC: ICD-10-PCS | Performed by: INTERNAL MEDICINE

## 2017-01-01 PROCEDURE — 83735 ASSAY OF MAGNESIUM: CPT | Performed by: INTERNAL MEDICINE

## 2017-01-01 PROCEDURE — BT40ZZZ ULTRASONOGRAPHY OF BLADDER: ICD-10-PCS | Performed by: EMERGENCY MEDICINE

## 2017-01-01 RX ORDER — SODIUM CHLORIDE 0.9 % (FLUSH) 0.9 %
5-10 SYRINGE (ML) INJECTION EVERY 8 HOURS
Status: DISCONTINUED | OUTPATIENT
Start: 2017-01-01 | End: 2017-01-01

## 2017-01-01 RX ORDER — SODIUM CHLORIDE 0.9 % (FLUSH) 0.9 %
5-10 SYRINGE (ML) INJECTION EVERY 8 HOURS
Status: DISCONTINUED | OUTPATIENT
Start: 2017-01-01 | End: 2017-01-01 | Stop reason: HOSPADM

## 2017-01-01 RX ORDER — LORAZEPAM 2 MG/ML
2 INJECTION INTRAMUSCULAR
Status: DISCONTINUED | OUTPATIENT
Start: 2017-01-01 | End: 2017-01-01 | Stop reason: HOSPADM

## 2017-01-01 RX ORDER — ESCITALOPRAM OXALATE 10 MG/1
5 TABLET ORAL EVERY EVENING
Status: DISCONTINUED | OUTPATIENT
Start: 2017-01-01 | End: 2017-01-01 | Stop reason: HOSPADM

## 2017-01-01 RX ORDER — GLYCOPYRROLATE 0.2 MG/ML
0.4 INJECTION INTRAMUSCULAR; INTRAVENOUS
Status: DISCONTINUED | OUTPATIENT
Start: 2017-01-01 | End: 2017-01-01 | Stop reason: HOSPADM

## 2017-01-01 RX ORDER — HYDROCODONE BITARTRATE AND ACETAMINOPHEN 5; 325 MG/1; MG/1
1 TABLET ORAL
Status: DISCONTINUED | OUTPATIENT
Start: 2017-01-01 | End: 2017-01-01 | Stop reason: HOSPADM

## 2017-01-01 RX ORDER — LIDOCAINE HYDROCHLORIDE AND EPINEPHRINE 20; 10 MG/ML; UG/ML
2-20 INJECTION, SOLUTION INFILTRATION; PERINEURAL ONCE
Status: COMPLETED | OUTPATIENT
Start: 2017-01-01 | End: 2017-01-01

## 2017-01-01 RX ORDER — SODIUM CHLORIDE 0.9 % (FLUSH) 0.9 %
5-10 SYRINGE (ML) INJECTION AS NEEDED
Status: DISCONTINUED | OUTPATIENT
Start: 2017-01-01 | End: 2017-01-01 | Stop reason: HOSPADM

## 2017-01-01 RX ORDER — POLYVINYL ALCOHOL 14 MG/ML
1 SOLUTION/ DROPS OPHTHALMIC AS NEEDED
Status: DISCONTINUED | OUTPATIENT
Start: 2017-01-01 | End: 2017-01-01 | Stop reason: HOSPADM

## 2017-01-01 RX ORDER — ONDANSETRON 2 MG/ML
4 INJECTION INTRAMUSCULAR; INTRAVENOUS
Status: DISCONTINUED | OUTPATIENT
Start: 2017-01-01 | End: 2017-01-01 | Stop reason: HOSPADM

## 2017-01-01 RX ORDER — LORAZEPAM 2 MG/ML
1 INJECTION INTRAMUSCULAR
Status: DISCONTINUED | OUTPATIENT
Start: 2017-01-01 | End: 2017-01-01

## 2017-01-01 RX ORDER — GLYCOPYRROLATE 0.2 MG/ML
0.2 INJECTION INTRAMUSCULAR; INTRAVENOUS
Status: DISCONTINUED | OUTPATIENT
Start: 2017-01-01 | End: 2017-01-01

## 2017-01-01 RX ORDER — LEVOTHYROXINE SODIUM 125 UG/1
125 TABLET ORAL
Status: DISCONTINUED | OUTPATIENT
Start: 2017-01-01 | End: 2017-01-01 | Stop reason: HOSPADM

## 2017-01-01 RX ORDER — ALBUTEROL SULFATE 2.5 MG/.5ML
2.5 SOLUTION RESPIRATORY (INHALATION)
Status: DISCONTINUED | OUTPATIENT
Start: 2017-01-01 | End: 2017-01-01 | Stop reason: HOSPADM

## 2017-01-01 RX ORDER — TAMSULOSIN HYDROCHLORIDE 0.4 MG/1
0.4 CAPSULE ORAL DAILY
Qty: 30 CAP | Refills: 0 | Status: SHIPPED | OUTPATIENT
Start: 2017-01-01

## 2017-01-01 RX ORDER — DOBUTAMINE HYDROCHLORIDE 200 MG/100ML
5 INJECTION INTRAVENOUS
Status: DISCONTINUED | OUTPATIENT
Start: 2017-01-01 | End: 2017-01-01

## 2017-01-01 RX ORDER — DOBUTAMINE HYDROCHLORIDE 200 MG/100ML
2.5-1 INJECTION INTRAVENOUS
Status: DISCONTINUED | OUTPATIENT
Start: 2017-01-01 | End: 2017-01-01 | Stop reason: SDUPTHER

## 2017-01-01 RX ORDER — HEPARIN SODIUM 1000 [USP'U]/ML
2000-6000 INJECTION, SOLUTION INTRAVENOUS; SUBCUTANEOUS ONCE
Status: COMPLETED | OUTPATIENT
Start: 2017-01-01 | End: 2017-01-01

## 2017-01-01 RX ORDER — DOBUTAMINE HYDROCHLORIDE 200 MG/100ML
INJECTION INTRAVENOUS
Status: DISCONTINUED
Start: 2017-01-01 | End: 2017-01-01

## 2017-01-01 RX ORDER — GLYCOPYRROLATE 0.2 MG/ML
0.1 INJECTION INTRAMUSCULAR; INTRAVENOUS
Status: DISCONTINUED | OUTPATIENT
Start: 2017-01-01 | End: 2017-01-01

## 2017-01-01 RX ORDER — ESCITALOPRAM OXALATE 5 MG/1
5 TABLET ORAL EVERY EVENING
Qty: 30 TAB | Refills: 0 | Status: SHIPPED | OUTPATIENT
Start: 2017-01-01

## 2017-01-01 RX ORDER — ASPIRIN 81 MG/1
81 TABLET ORAL DAILY
Status: DISCONTINUED | OUTPATIENT
Start: 2017-01-01 | End: 2017-01-01 | Stop reason: HOSPADM

## 2017-01-01 RX ORDER — NOREPINEPHRINE BITARTRATE/D5W 4MG/250ML
PLASTIC BAG, INJECTION (ML) INTRAVENOUS
Status: COMPLETED
Start: 2017-01-01 | End: 2017-01-01

## 2017-01-01 RX ORDER — FENTANYL CITRATE 50 UG/ML
12.5-5 INJECTION, SOLUTION INTRAMUSCULAR; INTRAVENOUS
Status: DISCONTINUED | OUTPATIENT
Start: 2017-01-01 | End: 2017-01-01

## 2017-01-01 RX ORDER — HYDROMORPHONE HYDROCHLORIDE 1 MG/ML
0.5 INJECTION, SOLUTION INTRAMUSCULAR; INTRAVENOUS; SUBCUTANEOUS
Status: DISCONTINUED | OUTPATIENT
Start: 2017-01-01 | End: 2017-01-01

## 2017-01-01 RX ORDER — CALCIUM ACETATE 667 MG/1
1 CAPSULE ORAL
Qty: 90 CAP | Refills: 0 | Status: SHIPPED | OUTPATIENT
Start: 2017-01-01

## 2017-01-01 RX ORDER — HEPARIN SODIUM 5000 [USP'U]/ML
5000 INJECTION, SOLUTION INTRAVENOUS; SUBCUTANEOUS EVERY 8 HOURS
Status: DISCONTINUED | OUTPATIENT
Start: 2017-01-01 | End: 2017-01-01 | Stop reason: HOSPADM

## 2017-01-01 RX ORDER — BUMETANIDE 1 MG/1
2 TABLET ORAL 2 TIMES DAILY
Status: DISCONTINUED | OUTPATIENT
Start: 2017-01-01 | End: 2017-01-01 | Stop reason: HOSPADM

## 2017-01-01 RX ORDER — HEPARIN SODIUM 5000 [USP'U]/ML
5000 INJECTION, SOLUTION INTRAVENOUS; SUBCUTANEOUS EVERY 8 HOURS
Status: DISCONTINUED | OUTPATIENT
Start: 2017-01-01 | End: 2017-01-01

## 2017-01-01 RX ORDER — NOREPINEPHRINE BITARTRATE/D5W 4MG/250ML
2-16 PLASTIC BAG, INJECTION (ML) INTRAVENOUS
Status: DISCONTINUED | OUTPATIENT
Start: 2017-01-01 | End: 2017-01-01 | Stop reason: SDUPTHER

## 2017-01-01 RX ORDER — POVIDONE-IODINE 10 %
SOLUTION, NON-ORAL TOPICAL 2 TIMES DAILY
Status: DISCONTINUED | OUTPATIENT
Start: 2017-01-01 | End: 2017-01-01 | Stop reason: HOSPADM

## 2017-01-01 RX ORDER — CALCIUM ACETATE 667 MG/1
1 CAPSULE ORAL
Status: DISCONTINUED | OUTPATIENT
Start: 2017-01-01 | End: 2017-01-01 | Stop reason: HOSPADM

## 2017-01-01 RX ORDER — LIDOCAINE HYDROCHLORIDE 20 MG/ML
1-20 INJECTION, SOLUTION INFILTRATION; PERINEURAL ONCE
Status: COMPLETED | OUTPATIENT
Start: 2017-01-01 | End: 2017-01-01

## 2017-01-01 RX ORDER — HYDROMORPHONE HYDROCHLORIDE 1 MG/ML
1 INJECTION, SOLUTION INTRAMUSCULAR; INTRAVENOUS; SUBCUTANEOUS
Status: DISCONTINUED | OUTPATIENT
Start: 2017-01-01 | End: 2017-01-01 | Stop reason: HOSPADM

## 2017-01-01 RX ORDER — SODIUM CHLORIDE 0.9 % (FLUSH) 0.9 %
5-10 SYRINGE (ML) INJECTION AS NEEDED
Status: DISCONTINUED | OUTPATIENT
Start: 2017-01-01 | End: 2017-01-01

## 2017-01-01 RX ORDER — BUMETANIDE 1 MG/1
2 TABLET ORAL 2 TIMES DAILY
Status: DISCONTINUED | OUTPATIENT
Start: 2017-01-01 | End: 2017-01-01

## 2017-01-01 RX ORDER — NYSTATIN 100000 [USP'U]/G
POWDER TOPICAL AS NEEDED
Status: DISCONTINUED | OUTPATIENT
Start: 2017-01-01 | End: 2017-01-01

## 2017-01-01 RX ORDER — MIDAZOLAM HYDROCHLORIDE 1 MG/ML
.5-2 INJECTION, SOLUTION INTRAMUSCULAR; INTRAVENOUS
Status: DISCONTINUED | OUTPATIENT
Start: 2017-01-01 | End: 2017-01-01

## 2017-01-01 RX ORDER — TAMSULOSIN HYDROCHLORIDE 0.4 MG/1
0.4 CAPSULE ORAL DAILY
Status: DISCONTINUED | OUTPATIENT
Start: 2017-01-01 | End: 2017-01-01 | Stop reason: HOSPADM

## 2017-01-01 RX ORDER — METOLAZONE 5 MG/1
5 TABLET ORAL 2 TIMES DAILY
Status: DISCONTINUED | OUTPATIENT
Start: 2017-01-01 | End: 2017-01-01 | Stop reason: HOSPADM

## 2017-01-01 RX ORDER — BUMETANIDE 0.25 MG/ML
2 INJECTION INTRAMUSCULAR; INTRAVENOUS EVERY 12 HOURS
Status: DISCONTINUED | OUTPATIENT
Start: 2017-01-01 | End: 2017-01-01

## 2017-01-01 RX ORDER — BUDESONIDE 0.5 MG/2ML
500 INHALANT ORAL
Status: DISCONTINUED | OUTPATIENT
Start: 2017-01-01 | End: 2017-01-01 | Stop reason: HOSPADM

## 2017-01-01 RX ORDER — MIDAZOLAM HYDROCHLORIDE 1 MG/ML
5 INJECTION, SOLUTION INTRAMUSCULAR; INTRAVENOUS ONCE
Status: ACTIVE | OUTPATIENT
Start: 2017-01-01 | End: 2017-01-01

## 2017-01-01 RX ORDER — HEPARIN SODIUM 5000 [USP'U]/ML
5000 INJECTION, SOLUTION INTRAVENOUS; SUBCUTANEOUS EVERY 8 HOURS
Status: DISCONTINUED | OUTPATIENT
Start: 2017-01-01 | End: 2017-01-01 | Stop reason: SDUPTHER

## 2017-01-01 RX ORDER — DIPHENHYDRAMINE HYDROCHLORIDE 50 MG/ML
50 INJECTION, SOLUTION INTRAMUSCULAR; INTRAVENOUS ONCE
Status: DISCONTINUED | OUTPATIENT
Start: 2017-01-01 | End: 2017-01-01

## 2017-01-01 RX ORDER — HEPARIN SODIUM 200 [USP'U]/100ML
1000 INJECTION, SOLUTION INTRAVENOUS ONCE
Status: COMPLETED | OUTPATIENT
Start: 2017-01-01 | End: 2017-01-01

## 2017-01-01 RX ORDER — CALCITRIOL 0.25 UG/1
0.25 CAPSULE ORAL DAILY
Status: DISCONTINUED | OUTPATIENT
Start: 2017-01-01 | End: 2017-01-01 | Stop reason: HOSPADM

## 2017-01-01 RX ADMIN — HEPARIN SODIUM 5000 UNITS: 5000 INJECTION, SOLUTION INTRAVENOUS; SUBCUTANEOUS at 05:41

## 2017-01-01 RX ADMIN — ALBUTEROL SULFATE 2.5 MG: 2.5 SOLUTION RESPIRATORY (INHALATION) at 20:02

## 2017-01-01 RX ADMIN — Medication 10 ML: at 13:40

## 2017-01-01 RX ADMIN — ALBUTEROL SULFATE 2.5 MG: 2.5 SOLUTION RESPIRATORY (INHALATION) at 20:15

## 2017-01-01 RX ADMIN — Medication 5 ML: at 22:44

## 2017-01-01 RX ADMIN — TAMSULOSIN HYDROCHLORIDE 0.4 MG: 0.4 CAPSULE ORAL at 15:27

## 2017-01-01 RX ADMIN — METOLAZONE 5 MG: 5 TABLET ORAL at 12:27

## 2017-01-01 RX ADMIN — INSULIN HUMAN 2 UNITS: 100 INJECTION, SOLUTION PARENTERAL at 05:41

## 2017-01-01 RX ADMIN — Medication 5 ML: at 13:14

## 2017-01-01 RX ADMIN — ALBUTEROL SULFATE 2.5 MG: 2.5 SOLUTION RESPIRATORY (INHALATION) at 22:46

## 2017-01-01 RX ADMIN — ALBUTEROL SULFATE 2.5 MG: 2.5 SOLUTION RESPIRATORY (INHALATION) at 14:43

## 2017-01-01 RX ADMIN — LEVOTHYROXINE SODIUM 125 MCG: 125 TABLET ORAL at 05:53

## 2017-01-01 RX ADMIN — INSULIN HUMAN 2 UNITS: 100 INJECTION, SOLUTION PARENTERAL at 13:24

## 2017-01-01 RX ADMIN — HUMAN INSULIN 5 UNITS: 100 INJECTION, SOLUTION SUBCUTANEOUS at 21:13

## 2017-01-01 RX ADMIN — CALCIUM ACETATE 667 MG: 667 CAPSULE ORAL at 18:03

## 2017-01-01 RX ADMIN — POVIDONE-IODINE: 10 SOLUTION TOPICAL at 08:00

## 2017-01-01 RX ADMIN — Medication 10 ML: at 23:02

## 2017-01-01 RX ADMIN — POVIDONE-IODINE: 10 SOLUTION TOPICAL at 22:43

## 2017-01-01 RX ADMIN — Medication 10 MCG/MIN: at 00:31

## 2017-01-01 RX ADMIN — HEPARIN SODIUM 5000 UNITS: 5000 INJECTION, SOLUTION INTRAVENOUS; SUBCUTANEOUS at 05:33

## 2017-01-01 RX ADMIN — LORAZEPAM 2 MG: 2 INJECTION INTRAMUSCULAR; INTRAVENOUS at 14:08

## 2017-01-01 RX ADMIN — HEPARIN SODIUM 5000 UNITS: 5000 INJECTION, SOLUTION INTRAVENOUS; SUBCUTANEOUS at 16:36

## 2017-01-01 RX ADMIN — HUMAN INSULIN 3 UNITS: 100 INJECTION, SOLUTION SUBCUTANEOUS at 18:08

## 2017-01-01 RX ADMIN — BUMETANIDE 2 MG: 1 TABLET ORAL at 22:59

## 2017-01-01 RX ADMIN — HEPARIN SODIUM 5000 UNITS: 5000 INJECTION, SOLUTION INTRAVENOUS; SUBCUTANEOUS at 13:09

## 2017-01-01 RX ADMIN — CALCITRIOL CAPSULES 0.25 MCG 0.25 MCG: 0.25 CAPSULE ORAL at 15:29

## 2017-01-01 RX ADMIN — HEPARIN SODIUM 5000 UNITS: 5000 INJECTION, SOLUTION INTRAVENOUS; SUBCUTANEOUS at 23:02

## 2017-01-01 RX ADMIN — BUDESONIDE 500 MCG: 0.5 SUSPENSION RESPIRATORY (INHALATION) at 07:27

## 2017-01-01 RX ADMIN — GLYCOPYRROLATE 0.2 MG: 0.2 INJECTION, SOLUTION INTRAMUSCULAR; INTRAVENOUS at 18:24

## 2017-01-01 RX ADMIN — HEPARIN SODIUM 5000 UNITS: 5000 INJECTION, SOLUTION INTRAVENOUS; SUBCUTANEOUS at 15:25

## 2017-01-01 RX ADMIN — METOLAZONE 5 MG: 5 TABLET ORAL at 17:36

## 2017-01-01 RX ADMIN — Medication 2 MCG/MIN: at 15:05

## 2017-01-01 RX ADMIN — ALBUTEROL SULFATE 2.5 MG: 2.5 SOLUTION RESPIRATORY (INHALATION) at 14:04

## 2017-01-01 RX ADMIN — ALBUTEROL SULFATE 2.5 MG: 2.5 SOLUTION RESPIRATORY (INHALATION) at 01:50

## 2017-01-01 RX ADMIN — HUMAN INSULIN 5 UNITS: 100 INJECTION, SOLUTION SUBCUTANEOUS at 12:36

## 2017-01-01 RX ADMIN — TAMSULOSIN HYDROCHLORIDE 0.4 MG: 0.4 CAPSULE ORAL at 14:04

## 2017-01-01 RX ADMIN — NOREPINEPHRINE BITARTRATE 16 MCG/MIN: 1 INJECTION INTRAVENOUS at 01:00

## 2017-01-01 RX ADMIN — HUMAN INSULIN 2 UNITS: 100 INJECTION, SOLUTION SUBCUTANEOUS at 11:30

## 2017-01-01 RX ADMIN — SODIUM CHLORIDE 40 MG: 9 INJECTION, SOLUTION INTRAMUSCULAR; INTRAVENOUS; SUBCUTANEOUS at 08:51

## 2017-01-01 RX ADMIN — CLINDAMYCIN PHOSPHATE 600 MG: 150 INJECTION, SOLUTION, CONCENTRATE INTRAVENOUS at 02:02

## 2017-01-01 RX ADMIN — ALBUTEROL SULFATE 2.5 MG: 2.5 SOLUTION RESPIRATORY (INHALATION) at 08:00

## 2017-01-01 RX ADMIN — POVIDONE-IODINE: 10 SOLUTION TOPICAL at 17:27

## 2017-01-01 RX ADMIN — GLYCOPYRROLATE 0.4 MG: 0.2 INJECTION, SOLUTION INTRAMUSCULAR; INTRAVENOUS at 14:08

## 2017-01-01 RX ADMIN — ESCITALOPRAM OXALATE 5 MG: 10 TABLET ORAL at 17:31

## 2017-01-01 RX ADMIN — METOLAZONE 5 MG: 5 TABLET ORAL at 18:04

## 2017-01-01 RX ADMIN — HUMAN INSULIN 4 UNITS: 100 INJECTION, SOLUTION SUBCUTANEOUS at 22:39

## 2017-01-01 RX ADMIN — INSULIN DETEMIR 10 UNITS: 100 INJECTION, SOLUTION SUBCUTANEOUS at 21:12

## 2017-01-01 RX ADMIN — BUMETANIDE 2 MG: 1 TABLET ORAL at 21:13

## 2017-01-01 RX ADMIN — LEVOTHYROXINE SODIUM 125 MCG: 125 TABLET ORAL at 05:54

## 2017-01-01 RX ADMIN — CALCIUM ACETATE 667 MG: 667 CAPSULE ORAL at 14:05

## 2017-01-01 RX ADMIN — HEPARIN SODIUM 2000 UNITS: 200 INJECTION, SOLUTION INTRAVENOUS at 10:04

## 2017-01-01 RX ADMIN — BUDESONIDE 500 MCG: 0.5 SUSPENSION RESPIRATORY (INHALATION) at 08:17

## 2017-01-01 RX ADMIN — HEPARIN SODIUM 5000 UNITS: 5000 INJECTION, SOLUTION INTRAVENOUS; SUBCUTANEOUS at 05:44

## 2017-01-01 RX ADMIN — LEVOTHYROXINE SODIUM 125 MCG: 125 TABLET ORAL at 05:34

## 2017-01-01 RX ADMIN — TAMSULOSIN HYDROCHLORIDE 0.4 MG: 0.4 CAPSULE ORAL at 13:09

## 2017-01-01 RX ADMIN — POVIDONE-IODINE: 10 SOLUTION TOPICAL at 14:07

## 2017-01-01 RX ADMIN — HEPARIN SODIUM 5000 UNITS: 5000 INJECTION, SOLUTION INTRAVENOUS; SUBCUTANEOUS at 14:05

## 2017-01-01 RX ADMIN — TAMSULOSIN HYDROCHLORIDE 0.4 MG: 0.4 CAPSULE ORAL at 17:26

## 2017-01-01 RX ADMIN — HUMAN INSULIN 4 UNITS: 100 INJECTION, SOLUTION SUBCUTANEOUS at 17:31

## 2017-01-01 RX ADMIN — BUDESONIDE 500 MCG: 0.5 SUSPENSION RESPIRATORY (INHALATION) at 19:49

## 2017-01-01 RX ADMIN — BUMETANIDE 2 MG: 1 TABLET ORAL at 22:55

## 2017-01-01 RX ADMIN — HEPARIN SODIUM 5000 UNITS: 5000 INJECTION, SOLUTION INTRAVENOUS; SUBCUTANEOUS at 15:01

## 2017-01-01 RX ADMIN — Medication 10 ML: at 05:47

## 2017-01-01 RX ADMIN — LIDOCAINE HYDROCHLORIDE 60 MG: 20 INJECTION, SOLUTION INFILTRATION; PERINEURAL at 10:15

## 2017-01-01 RX ADMIN — HEPARIN SODIUM 5000 UNITS: 5000 INJECTION, SOLUTION INTRAVENOUS; SUBCUTANEOUS at 07:51

## 2017-01-01 RX ADMIN — BUDESONIDE 500 MCG: 0.5 SUSPENSION RESPIRATORY (INHALATION) at 20:03

## 2017-01-01 RX ADMIN — ALBUTEROL SULFATE 2.5 MG: 2.5 SOLUTION RESPIRATORY (INHALATION) at 19:52

## 2017-01-01 RX ADMIN — ESCITALOPRAM OXALATE 5 MG: 10 TABLET ORAL at 18:12

## 2017-01-01 RX ADMIN — LEVOTHYROXINE SODIUM 125 MCG: 125 TABLET ORAL at 07:51

## 2017-01-01 RX ADMIN — CALCIUM ACETATE 667 MG: 667 CAPSULE ORAL at 16:37

## 2017-01-01 RX ADMIN — DOBUTAMINE IN DEXTROSE 5 MCG/KG/MIN: 200 INJECTION, SOLUTION INTRAVENOUS at 21:33

## 2017-01-01 RX ADMIN — POVIDONE-IODINE: 10 SOLUTION TOPICAL at 21:15

## 2017-01-01 RX ADMIN — ALBUTEROL SULFATE 2.5 MG: 2.5 SOLUTION RESPIRATORY (INHALATION) at 13:39

## 2017-01-01 RX ADMIN — SODIUM BICARBONATE 1 ML: 0.2 INJECTION, SOLUTION INTRAVENOUS at 10:15

## 2017-01-01 RX ADMIN — ASPIRIN 81 MG: 81 TABLET, COATED ORAL at 14:05

## 2017-01-01 RX ADMIN — Medication 10 ML: at 05:48

## 2017-01-01 RX ADMIN — ALBUTEROL SULFATE 2.5 MG: 2.5 SOLUTION RESPIRATORY (INHALATION) at 19:49

## 2017-01-01 RX ADMIN — Medication 10 ML: at 05:19

## 2017-01-01 RX ADMIN — HEPARIN SODIUM 3700 UNITS: 1000 INJECTION, SOLUTION INTRAVENOUS; SUBCUTANEOUS at 10:25

## 2017-01-01 RX ADMIN — LIDOCAINE HYDROCHLORIDE,EPINEPHRINE BITARTRATE 120 MG: 20; .01 INJECTION, SOLUTION INFILTRATION; PERINEURAL at 10:17

## 2017-01-01 RX ADMIN — SODIUM CHLORIDE 40 MG: 9 INJECTION, SOLUTION INTRAMUSCULAR; INTRAVENOUS; SUBCUTANEOUS at 09:05

## 2017-01-01 RX ADMIN — VASOPRESSIN 0.02 UNITS/MIN: 20 INJECTION INTRAVENOUS at 22:19

## 2017-01-01 RX ADMIN — HEPARIN SODIUM 5000 UNITS: 5000 INJECTION, SOLUTION INTRAVENOUS; SUBCUTANEOUS at 21:14

## 2017-01-01 RX ADMIN — ALBUTEROL SULFATE 2.5 MG: 2.5 SOLUTION RESPIRATORY (INHALATION) at 07:35

## 2017-01-01 RX ADMIN — HEPARIN SODIUM 5000 UNITS: 5000 INJECTION, SOLUTION INTRAVENOUS; SUBCUTANEOUS at 22:54

## 2017-01-01 RX ADMIN — HUMAN INSULIN 3 UNITS: 100 INJECTION, SOLUTION SUBCUTANEOUS at 09:12

## 2017-01-01 RX ADMIN — BUMETANIDE 2 MG: 1 TABLET ORAL at 10:00

## 2017-01-01 RX ADMIN — INSULIN HUMAN 2 UNITS: 100 INJECTION, SOLUTION PARENTERAL at 12:00

## 2017-01-01 RX ADMIN — METOLAZONE 5 MG: 5 TABLET ORAL at 17:19

## 2017-01-01 RX ADMIN — ASPIRIN 81 MG: 81 TABLET, COATED ORAL at 10:00

## 2017-01-01 RX ADMIN — BUMETANIDE 2 MG: 1 TABLET ORAL at 20:30

## 2017-01-01 RX ADMIN — BUDESONIDE 500 MCG: 0.5 SUSPENSION RESPIRATORY (INHALATION) at 19:52

## 2017-01-01 RX ADMIN — Medication 10 ML: at 15:01

## 2017-01-01 RX ADMIN — INSULIN DETEMIR 10 UNITS: 100 INJECTION, SOLUTION SUBCUTANEOUS at 23:01

## 2017-01-01 RX ADMIN — SODIUM CHLORIDE 1000 ML: 900 INJECTION, SOLUTION INTRAVENOUS at 18:50

## 2017-01-01 RX ADMIN — ASPIRIN 81 MG: 81 TABLET, COATED ORAL at 13:09

## 2017-01-01 RX ADMIN — CLINDAMYCIN PHOSPHATE 600 MG: 150 INJECTION, SOLUTION, CONCENTRATE INTRAVENOUS at 13:44

## 2017-01-01 RX ADMIN — INSULIN DETEMIR 10 UNITS: 100 INJECTION, SOLUTION SUBCUTANEOUS at 21:47

## 2017-01-01 RX ADMIN — INSULIN DETEMIR 10 UNITS: 100 INJECTION, SOLUTION SUBCUTANEOUS at 22:41

## 2017-01-01 RX ADMIN — HUMAN INSULIN 8 UNITS: 100 INJECTION, SOLUTION SUBCUTANEOUS at 21:48

## 2017-01-01 RX ADMIN — HEPARIN SODIUM 5000 UNITS: 5000 INJECTION, SOLUTION INTRAVENOUS; SUBCUTANEOUS at 05:52

## 2017-01-01 RX ADMIN — CALCITRIOL CAPSULES 0.25 MCG 0.25 MCG: 0.25 CAPSULE ORAL at 13:08

## 2017-01-01 RX ADMIN — GLYCOPYRROLATE 0.2 MG: 0.2 INJECTION, SOLUTION INTRAMUSCULAR; INTRAVENOUS at 01:35

## 2017-01-01 RX ADMIN — ALBUTEROL SULFATE 2.5 MG: 2.5 SOLUTION RESPIRATORY (INHALATION) at 01:49

## 2017-01-01 RX ADMIN — CALCIUM ACETATE 667 MG: 667 CAPSULE ORAL at 13:09

## 2017-01-01 RX ADMIN — Medication 10 ML: at 14:06

## 2017-01-01 RX ADMIN — NOREPINEPHRINE BITARTRATE 14 MCG/MIN: 1 INJECTION INTRAVENOUS at 10:59

## 2017-01-01 RX ADMIN — BUMETANIDE 2 MG: 1 TABLET ORAL at 13:08

## 2017-01-01 RX ADMIN — HEPARIN SODIUM 5000 UNITS: 5000 INJECTION, SOLUTION INTRAVENOUS; SUBCUTANEOUS at 22:42

## 2017-01-01 RX ADMIN — HEPARIN SODIUM 5000 UNITS: 5000 INJECTION, SOLUTION INTRAVENOUS; SUBCUTANEOUS at 13:44

## 2017-01-01 RX ADMIN — INSULIN HUMAN 2 UNITS: 100 INJECTION, SOLUTION PARENTERAL at 01:32

## 2017-01-01 RX ADMIN — HUMAN INSULIN 5 UNITS: 100 INJECTION, SOLUTION SUBCUTANEOUS at 23:00

## 2017-01-01 RX ADMIN — POVIDONE-IODINE: 10 SOLUTION TOPICAL at 09:15

## 2017-01-01 RX ADMIN — ALBUTEROL SULFATE 2.5 MG: 2.5 SOLUTION RESPIRATORY (INHALATION) at 14:35

## 2017-01-01 RX ADMIN — CLINDAMYCIN PHOSPHATE 600 MG: 150 INJECTION, SOLUTION, CONCENTRATE INTRAVENOUS at 10:44

## 2017-01-01 RX ADMIN — TUBERCULIN PURIFIED PROTEIN DERIVATIVE 5 UNITS: 5 INJECTION, SOLUTION INTRADERMAL at 12:27

## 2017-01-01 RX ADMIN — BUDESONIDE 500 MCG: 0.5 SUSPENSION RESPIRATORY (INHALATION) at 07:35

## 2017-01-01 RX ADMIN — NOREPINEPHRINE BITARTRATE 16 MCG/MIN: 1 INJECTION INTRAVENOUS at 22:20

## 2017-01-01 RX ADMIN — ALBUTEROL SULFATE 2.5 MG: 2.5 SOLUTION RESPIRATORY (INHALATION) at 07:27

## 2017-01-01 RX ADMIN — TAMSULOSIN HYDROCHLORIDE 0.4 MG: 0.4 CAPSULE ORAL at 12:00

## 2017-01-01 RX ADMIN — Medication 10 ML: at 22:56

## 2017-01-01 RX ADMIN — CALCITRIOL CAPSULES 0.25 MCG 0.25 MCG: 0.25 CAPSULE ORAL at 14:05

## 2017-01-01 RX ADMIN — CALCITRIOL CAPSULES 0.25 MCG 0.25 MCG: 0.25 CAPSULE ORAL at 09:59

## 2017-01-01 RX ADMIN — POVIDONE-IODINE: 10 SOLUTION TOPICAL at 20:30

## 2017-01-01 RX ADMIN — LEVOTHYROXINE SODIUM 125 MCG: 125 TABLET ORAL at 05:20

## 2017-01-01 RX ADMIN — HUMAN INSULIN 2 UNITS: 100 INJECTION, SOLUTION SUBCUTANEOUS at 07:30

## 2017-01-01 RX ADMIN — Medication 10 ML: at 07:53

## 2017-01-01 RX ADMIN — Medication 10 ML: at 05:28

## 2017-01-01 RX ADMIN — INSULIN HUMAN 2 UNITS: 100 INJECTION, SOLUTION PARENTERAL at 18:13

## 2017-01-01 RX ADMIN — CLINDAMYCIN PHOSPHATE 600 MG: 150 INJECTION, SOLUTION, CONCENTRATE INTRAVENOUS at 19:33

## 2017-01-01 RX ADMIN — Medication 10 ML: at 14:09

## 2017-01-01 RX ADMIN — HEPARIN SODIUM 5000 UNITS: 5000 INJECTION, SOLUTION INTRAVENOUS; SUBCUTANEOUS at 23:00

## 2017-01-01 RX ADMIN — HEPARIN SODIUM 5000 UNITS: 5000 INJECTION, SOLUTION INTRAVENOUS; SUBCUTANEOUS at 13:40

## 2017-01-01 RX ADMIN — BUDESONIDE 500 MCG: 0.5 SUSPENSION RESPIRATORY (INHALATION) at 22:46

## 2017-01-01 RX ADMIN — ASPIRIN 81 MG: 81 TABLET, COATED ORAL at 15:28

## 2017-01-01 RX ADMIN — ERYTHROPOIETIN 10000 UNITS: 10000 INJECTION, SOLUTION INTRAVENOUS; SUBCUTANEOUS at 12:26

## 2017-01-01 RX ADMIN — INSULIN DETEMIR 10 UNITS: 100 INJECTION, SOLUTION SUBCUTANEOUS at 22:55

## 2017-01-01 RX ADMIN — Medication 8 MCG/MIN: at 01:24

## 2017-01-01 RX ADMIN — NOREPINEPHRINE BITARTRATE 16 MCG/MIN: 1 INJECTION INTRAVENOUS at 05:41

## 2017-01-01 RX ADMIN — INSULIN HUMAN 4 UNITS: 100 INJECTION, SOLUTION PARENTERAL at 05:33

## 2017-01-01 RX ADMIN — CALCIUM ACETATE 667 MG: 667 CAPSULE ORAL at 17:31

## 2017-01-01 RX ADMIN — POVIDONE-IODINE: 10 SOLUTION TOPICAL at 23:04

## 2017-01-01 RX ADMIN — HYDROMORPHONE HYDROCHLORIDE 1 MG: 1 INJECTION, SOLUTION INTRAMUSCULAR; INTRAVENOUS; SUBCUTANEOUS at 14:08

## 2017-01-01 RX ADMIN — BUDESONIDE 500 MCG: 0.5 SUSPENSION RESPIRATORY (INHALATION) at 20:15

## 2017-05-25 PROBLEM — I73.9 PAD (PERIPHERAL ARTERY DISEASE) (HCC): Status: ACTIVE | Noted: 2017-01-01

## 2017-05-25 PROBLEM — L97.529 FOOT ULCER, LEFT (HCC): Status: ACTIVE | Noted: 2017-01-01

## 2017-05-28 PROBLEM — I48.91 A-FIB (HCC): Status: RESOLVED | Noted: 2017-01-01 | Resolved: 2017-01-01

## 2017-05-28 PROBLEM — N18.5 ANEMIA OF CHRONIC RENAL FAILURE, STAGE 5 (HCC): Status: ACTIVE | Noted: 2017-01-01

## 2017-05-28 PROBLEM — E87.5 HYPERKALEMIA: Status: ACTIVE | Noted: 2017-01-01

## 2017-05-28 PROBLEM — E11.9 DM (DIABETES MELLITUS) (HCC): Status: ACTIVE | Noted: 2017-01-01

## 2017-05-28 PROBLEM — G93.40 ENCEPHALOPATHY: Status: ACTIVE | Noted: 2017-01-01

## 2017-05-28 PROBLEM — E66.01 MORBID OBESITY (HCC): Status: ACTIVE | Noted: 2017-01-01

## 2017-05-28 PROBLEM — J44.9 COPD (CHRONIC OBSTRUCTIVE PULMONARY DISEASE) (HCC): Status: ACTIVE | Noted: 2017-01-01

## 2017-05-28 PROBLEM — I48.91 A-FIB (HCC): Status: ACTIVE | Noted: 2017-01-01

## 2017-05-28 PROBLEM — G47.33 OSA ON CPAP: Status: ACTIVE | Noted: 2017-01-01

## 2017-05-28 PROBLEM — N18.5 CKD (CHRONIC KIDNEY DISEASE) STAGE 5, GFR LESS THAN 15 ML/MIN (HCC): Status: ACTIVE | Noted: 2017-01-01

## 2017-05-28 PROBLEM — Z99.89 OSA ON CPAP: Status: ACTIVE | Noted: 2017-01-01

## 2017-05-28 PROBLEM — N19 UREMIA: Status: ACTIVE | Noted: 2017-01-01

## 2017-05-28 PROBLEM — D63.1 ANEMIA OF CHRONIC RENAL FAILURE, STAGE 5 (HCC): Status: ACTIVE | Noted: 2017-01-01

## 2017-05-28 NOTE — ED PROVIDER NOTES
HPI Comments: Patient with history of diabetes COPD chronic kidney disease. States last creatinine was 4  Something. wworse today. For the past 2 days has had increased fatigue and weakness unable to get out of bed today. Also has bilateral lower extremity edema with left foot ulcer not healing. Family reports some confusion over the past couple days. Patient reports shortness of breath with exertion. No chest pain. Patient is a 68 y.o. male presenting with fatigue. The history is provided by the patient. No  was used. Fatigue   This is a new problem. The current episode started 2 days ago. The problem has been gradually worsening. There was no focality noted. Primary symptoms include loss of sensation, mental status change and disorientation. Pertinent negatives include no focal weakness, no slurred speech, no speech difficulty, no movement disorder, no agitation, no visual change, no auditory change and no unresponsiveness. Associated symptoms include shortness of breath and confusion. Pertinent negatives include no chest pain, no vomiting, no headaches, no nausea, no bowel incontinence and no bladder incontinence. Past Medical History:   Diagnosis Date    Cancer [facility])     prostate    Cellulitis     Chronic kidney disease     Chronic obstructive pulmonary disease (HCC)     Chronic pain     Depression     Diabetes (HCC)     Edema     Numbness     knee's down    Thyroid disease     Vitamin D deficiency        Past Surgical History:   Procedure Laterality Date    HX APPENDECTOMY      HX CHOLECYSTECTOMY      HX HERNIA REPAIR      HX PROSTATECTOMY           Family History:   Problem Relation Age of Onset    No Known Problems Mother     Stroke Father        Social History     Social History    Marital status:      Spouse name: N/A    Number of children: N/A    Years of education: N/A     Occupational History    Not on file.      Social History Main Topics  Smoking status: Former Smoker    Smokeless tobacco: Never Used    Alcohol use Not on file    Drug use: Not on file    Sexual activity: Not on file     Other Topics Concern    Not on file     Social History Narrative         ALLERGIES: Review of patient's allergies indicates no known allergies. Review of Systems   Constitutional: Positive for fatigue. Negative for chills and fever. HENT: Negative for rhinorrhea and sore throat. Eyes: Negative for pain, redness and visual disturbance. Respiratory: Positive for shortness of breath. Negative for chest tightness and wheezing. Cardiovascular: Positive for leg swelling. Negative for chest pain. Gastrointestinal: Negative for abdominal pain, bowel incontinence, diarrhea, nausea and vomiting. Genitourinary: Positive for difficulty urinating. Negative for bladder incontinence, dysuria and hematuria. Musculoskeletal: Positive for gait problem. Negative for back pain, neck pain and neck stiffness. Skin: Positive for color change and wound. Negative for rash. Neurological: Positive for weakness and numbness. Negative for focal weakness, speech difficulty and headaches. Psychiatric/Behavioral: Positive for confusion. Negative for agitation. Vitals:    05/28/17 1553 05/28/17 1612 05/28/17 1615 05/28/17 1630   BP: (!) 117/37 153/77 127/58 116/53   Pulse: 87 83 83 78   Resp: 18      SpO2: 91% 95% 98% 99%   Weight: (!) 167.8 kg (370 lb)      Height: 5' 11\" (1.803 m)               Physical Exam   Constitutional: He is oriented to person, place, and time. He appears well-developed and well-nourished. HENT:   Head: Normocephalic and atraumatic. Eyes: Conjunctivae and EOM are normal. Pupils are equal, round, and reactive to light. Neck: Normal range of motion. Neck supple. Cardiovascular: Normal rate and regular rhythm. No murmur heard.   Pulmonary/Chest: Breath sounds normal. He is in respiratory distress (mild increased effort on oxygen. ). He has no wheezes. Abdominal: Soft. Bowel sounds are normal. He exhibits distension. There is no tenderness. Musculoskeletal: He exhibits edema and tenderness (BLE ). Erythema present BLE. Neurological: He is alert and oriented to person, place, and time. No cranial nerve deficit. Skin: Skin is warm and dry. There is erythema. Nursing note and vitals reviewed. MDM  Number of Diagnoses or Management Options  Diagnosis management comments: Pt with worsening renal failure and hyperkalemia. Atrial fibrillation on EKG with rate 66. No peaked T waves. Last creatinine per family 4. Will admit. Confusion present. Amount and/or Complexity of Data Reviewed  Clinical lab tests: ordered and reviewed  Tests in the radiology section of CPT®: ordered and reviewed  Tests in the medicine section of CPT®: ordered and reviewed    Patient Progress  Patient progress: stable    ED Course       Procedures      EKG: nonspecific ST and T waves changes, atrial fibrillation, rate 66. XR CHEST PORT (Final result) Result time: 05/28/17 18:44:05     Final result by Alena Chand MD (05/28/17 18:44:05)     Impression:     IMPRESSION:  Cardiomegaly suspected. Mild interstitial prominence which could be  acute or chronic. Minimal band of atelectasis left midlung zone.           Narrative:     CHEST X-RAY, one view. HISTORY:  Shortness of breath. TECHNIQUE:  AP upright view. COMPARISON: None. FINDINGS:   Heart may be enlarged. Pulmonary vasculature are grossly  unremarkable. Mild increased interstitial markings which could be acute or  chronic. There are aortic calcifications. Minimal band of atelectasis left  midlung zone.                  XR FOOT LT MIN 3 V (Final result) Result time: 05/28/17 18:45:17     Final result by Alena Chand MD (05/28/17 18:45:17)     Impression:     IMPRESSION: No periostitis or erosions.  Extensive vascular calcifications.         Narrative:     LEFT FOOT 3 views. HISTORY: Left foot pain with diabetic foot ulcer. TECHNIQUE: AP and lateral and oblique views. COMPARISON: None. FINDINGS: There are extensive vascular calcifications suggesting diabetes. These  extend all the way to the distal phalanx. No periostitis or erosions. No  fractures. Small calcaneal spur is present.                 CT UROGRAM WO CONT (Final result) Result time: 05/28/17 18:11:05     Final result by Amanda Poon MD (05/28/17 18:11:05)     Impression:     IMPRESSION:  Negative for radiopaque urinary tract calculi or evidence of  obstruction. Other findings as above.         Narrative:     CT ABDOMEN AND PELVIS WITHOUT CONTRAST. HISTORY:  Flank pain and renal failure. COMPARISON: None. TECHNIQUE:  5mm axial images from above the kidneys through the bladder base  using renal stone protocol.  Radiation dose reduction techniques were used for  this study.  Our CT scanners use one or more of the following:  Automated  exposure control, adjustment of the mA and or kV according to patient size,  iterative reconstruction. FINDINGS:    Abdomen:  Patient is obese which creates artifact and decreases sensitivity of  the exam. No definite radiopaque urinary tract calculi in the right kidney, left  kidney or upper ureters.   No hydronephrosis or hydroureter. No acute  perinephric or periureteral stranding densities.  There are vascular  calcifications including the kidneys. Kidneys appear somewhat atrophic. Probable  simple cyst right kidney, 2 cm. Included portion of the liver and spleen unremarkable on this noncontrast exam.  There are small bilateral pleural effusions.  Aorta normal caliber and  calcified. Stranding densities in the abdominal wall, edema versus cellulitis. Pelvis: No calcified stones in the lower ureters or within the urinary bladder. Urinary bladder is grossly unremarkable.  Multiple densities in the prostate  suggest prostate cancer radiation therapy.                 CT HEAD WO CONT (Final result) Result time: 05/28/17 18:07:57     Final result by Antionette Coelho MD (05/28/17 18:07:57)     Impression:     IMPRESSION:  Negative for acute intracranial abnormality.  Chronic changes.         Narrative:     CT HEAD WITHOUT CONTRAST. INDICATION: Confusion. COMPARISON: None.      TECHNIQUE:   5 mm axial scans from the skull base to the vertex.  Our CT  scanners use one or more of the following:  Automated exposure control,  adjustment of the mA and or kV according to patient size, iterative  reconstruction. FINDINGS:  No acute intraparenchymal hemorrhage or abnormal extra-axial fluid  collection.  The ventricles are normal size. There is an arachnoid cyst in the  left middle cranial fossa, 5 cm, chronic and not of clinical significance. Mild  mass effect on the left temporal lobe. .  Included portion of the paranasal  sinuses and orbits grossly demonstrates a small amount of mucosal thickening in  the left maxillary sinus. .             Results Include:    Recent Results (from the past 24 hour(s))   CBC WITH AUTOMATED DIFF    Collection Time: 05/28/17  3:57 PM   Result Value Ref Range    WBC 6.8 4.3 - 11.1 K/uL    RBC 2.73 (L) 4.23 - 5.67 M/uL    HGB 8.2 (L) 13.6 - 17.2 g/dL    HCT 25.9 (L) 41.1 - 50.3 %    MCV 94.9 79.6 - 97.8 FL    MCH 30.0 26.1 - 32.9 PG    MCHC 31.7 31.4 - 35.0 g/dL    RDW 13.5 11.9 - 14.6 %    PLATELET 092 353 - 237 K/uL    MPV 9.9 (L) 10.8 - 14.1 FL    DF AUTOMATED      NEUTROPHILS 80 (H) 43 - 78 %    LYMPHOCYTES 9 (L) 13 - 44 %    MONOCYTES 9 4.0 - 12.0 %    EOSINOPHILS 1 0.5 - 7.8 %    BASOPHILS 0 0.0 - 2.0 %    IMMATURE GRANULOCYTES 0.6 0.0 - 5.0 %    ABS. NEUTROPHILS 5.5 1.7 - 8.2 K/UL    ABS. LYMPHOCYTES 0.6 0.5 - 4.6 K/UL    ABS. MONOCYTES 0.6 0.1 - 1.3 K/UL    ABS. EOSINOPHILS 0.1 0.0 - 0.8 K/UL    ABS. BASOPHILS 0.0 0.0 - 0.2 K/UL    ABS. IMM.  GRANS. 0.0 0.0 - 0.5 K/UL   METABOLIC PANEL, COMPREHENSIVE Collection Time: 05/28/17  3:57 PM   Result Value Ref Range    Sodium 134 (L) 136 - 145 mmol/L    Potassium 5.3 (H) 3.5 - 5.1 mmol/L    Chloride 95 (L) 98 - 107 mmol/L    CO2 23 21 - 32 mmol/L    Anion gap 16 7 - 16 mmol/L    Glucose 205 (H) 65 - 100 mg/dL     (H) 8 - 23 MG/DL    Creatinine 8.18 (H) 0.8 - 1.5 MG/DL    GFR est AA 8 (L) >60 ml/min/1.73m2    GFR est non-AA 7 (L) >60 ml/min/1.73m2    Calcium 7.6 (L) 8.3 - 10.4 MG/DL    Bilirubin, total 0.3 0.2 - 1.1 MG/DL    ALT (SGPT) 23 12 - 65 U/L    AST (SGOT) 10 (L) 15 - 37 U/L    Alk.  phosphatase 107 50 - 136 U/L    Protein, total 7.4 6.3 - 8.2 g/dL    Albumin 2.8 (L) 3.2 - 4.6 g/dL    Globulin 4.6 (H) 2.3 - 3.5 g/dL    A-G Ratio 0.6 (L) 1.2 - 3.5     BNP    Collection Time: 05/28/17  3:57 PM   Result Value Ref Range     pg/mL   LIPASE    Collection Time: 05/28/17  3:57 PM   Result Value Ref Range    Lipase 101 73 - 393 U/L   POC LACTIC ACID    Collection Time: 05/28/17  6:30 PM   Result Value Ref Range    Lactic Acid (POC) 1.7 0.5 - 1.9 mmol/L

## 2017-05-28 NOTE — IP AVS SNAPSHOT
[facility] 
 
 
 [address] 
[phone] Patient: Mary Brown MRN: [id number] UNI:[id number] You are allergic to the following No active allergies Immunizations Administered for This Admission Name Date  
 TB Skin Test (PPD) Intradermal 5/29/2017 Recent Documentation Height Weight BMI Smoking Status 1.803 m (!) 164.2 kg 50.49 kg/m2 Former Smoker Emergency Contacts Name Discharge Info Relation Home Work Mobile Sangita De La Torre  Daughter [21] [address]  Spouse [3] [phone] About your hospitalization You were admitted on:  May 28, 2017 You last received care in the:  [facility] 6 MED SURG You were discharged on:  June 2, 2017 Unit phone number:  [phone] Why you were hospitalized Your primary diagnosis was:  Acute On Chronic Kidney Failure (Hcc) Your diagnoses also included:  Encephalopathy, Foot Ulcer, Left (Hcc), Pad (Peripheral Artery Disease) (Hcc), Ckd (Chronic Kidney Disease) Stage 5, Gfr Less Than 15 Ml/Min (Hcc), Uremia, Anemia Of Chronic Renal Failure, Stage 5 (Hcc), A-Fib (Hcc), Copd (Chronic Obstructive Pulmonary Disease) (Hcc), Morbid Obesity (Hcc), Hyperkalemia, Dm (Diabetes Mellitus) ([facility]), Chacho On Cpap, Anasarca, 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), Esrd On Hemodialysis (Hcc), Urinary Retention Providers Seen During Your Hospitalizations Provider Role Specialty Primary office phone Ilene Diaz MD Attending Provider Emergency Medicine [phone] Selena Bradford MD Attending Provider Internal Medicine [phone] Your Primary Care Physician (PCP) Primary Care Physician Office Phone Office Fax Carol Ann Otero [phone] Follow-up Information Follow up With Details Comments Contact Info [address] Replaced by [facility] [address] [facility]  Monday/Wednesday/Friday  6:30 am [address] Phone [phone] 
16 Lesly Morales [address]  PT and OT and Nursing. They will call prior to coming to your home. 2700 [facility] [address] [place] [address] [address] [phone] Jaiden Cantu MD   [address] [address] [phone] [facility] 2 On 6/6/2017 at 8:00 to evaluate davenport removal. 1955 [facility] 
Benny Santiagozara Naidu 151 [address] [phone] Trever Miguel MD  as scheduled [facility] [address] [facility] [address] [facility] [address] [address] 
[phone] 
  
 wheelchair  Call [phone] to checkon the status. a wheelchair has been ordered from [address] They will contact you with delivery time and date. Your Appointments Tuesday June 06, 2017  8:30 AM EDT Office Visit with Roger Valencia NP [facility] 50 (U [facility]OLOG[facility]) 1441 [facility] [place] [address] [place] [address]  
[phone] Thursday June 08, 2017 11:15 AM EDT  
PAT BASIC with SFD ASSESSMENT  02 [facility] PRE ASSESSMENT ([facility] OR PRE ASSESSMENT) [address] [place] [address]  
[phone] Friday June 16, 2017 ARTERIO VENOUS FISTULA/GRAFT ARM with Trever Miguel MD  
SFD SURGERY ([address] [place] [address]) [address]  
[phone] Thursday July 06, 2017 10:00 AM EDT Global Post Op with Trever Miguel MD  
[facility] ([facility]) [address] [place] [address] [phone] Current Discharge Medication List  
  
START taking these medications Dose & Instructions Dispensing Information Comments Morning Noon Evening Bedtime  
 calcium acetate 667 mg Cap Commonly known as:  PHOSLO Your last dose was: Your next dose is: Dose:  1 Cap Take 1 Cap by mouth three (3) times daily (with meals). Quantity:  90 Cap Refills:  0  
     
   
   
   
  
 escitalopram oxalate 5 mg tablet Commonly known as:  Christopher Buck Your last dose was: Your next dose is:    
   
   
 Dose:  5 mg Take 1 Tab by mouth every evening. Indications: major depressive disorder Quantity:  30 Tab Refills:  0  
     
   
   
   
  
 tamsulosin 0.4 mg capsule Commonly known as:  FLOMAX Your last dose was: Your next dose is:    
   
   
 Dose:  0.4 mg Take 1 Cap by mouth daily. Quantity:  30 Cap Refills:  0 CONTINUE these medications which have NOT CHANGED Dose & Instructions Dispensing Information Comments Morning Noon Evening Bedtime  
 aspirin delayed-release 81 mg tablet Your last dose was: Your next dose is: Take  by mouth daily. Refills:  0 Biotin 2,500 mcg Cap Your last dose was: Your next dose is: Take  by mouth. Refills:  0  
     
   
   
   
  
 bumetanide 2 mg tablet Commonly known as:  Sarmad Olivaff Your last dose was: Your next dose is:    
   
   
 Dose:  2 mg Take 2 mg by mouth two (2) times a day. Refills:  0  
     
   
   
   
  
 calcitRIOL 0.25 mcg capsule Commonly known as:  ROCALTROL Your last dose was: Your next dose is:    
   
   
 Dose:  0.25 mcg Take 0.25 mcg by mouth daily. Refills:  0 LEVEMIR FLEXPEN 100 unit/mL (3 mL) Inpn Generic drug:  insulin detemir Your last dose was: Your next dose is:    
   
   
 Dose:  10 Units 10 Units by SubCUTAneous route nightly. Indications: type 2 diabetes mellitus Refills:  0 LEVOXYL 125 mcg tablet Generic drug:  levothyroxine Your last dose was: Your next dose is: Take  by mouth Daily (before breakfast). Refills:  0  
     
   
   
   
  
 metOLazone 5 mg tablet Commonly known as:  Tanja Bayron Your last dose was: Your next dose is: Take  by mouth daily. Refills:  0  
     
   
   
   
  
 SYMBICORT 160-4.5 mcg/actuation HFA inhaler Generic drug:  budesonide-formoterol Your last dose was: Your next dose is:    
   
   
 Dose:  2 Puff Take 2 Puffs by inhalation two (2) times a day. Refills:  0  
     
   
   
   
  
 TYLENOL-CODEINE #3 300-30 mg per tablet Generic drug:  acetaminophen-codeine Your last dose was: Your next dose is:    
   
   
 Dose:  1 Tab Take 1 Tab by mouth every four (4) hours as needed for Pain. Refills:  0 STOP taking these medications   
 amoxicillin-clavulanate 875-125 mg per tablet Commonly known as:  AUGMENTIN  
   
  
 gabapentin 300 mg capsule Commonly known as:  NEURONTIN Where to Get Your Medications These medications were sent to [address] [place] [address] AT 1000 [facility] [address] [place] [address] Way [address] Phone:  [phone]  
  escitalopram oxalate 5 mg tablet Information on where to get these meds will be given to you by the nurse or doctor. ! Ask your nurse or doctor about these medications  
  calcium acetate 667 mg Cap  
 tamsulosin 0.4 mg capsule Discharge Instructions DISCHARGE SUMMARY from Nurse The following personal items are in your possession at time of discharge: 
 
Dental Appliances: None Visual Aid: Glasses Home Medications: None Jewelry: Bracelet, Watch, Necklace Clothing: At bedside, Pants, Shirt Other Valuables: Cell Phone PATIENT INSTRUCTIONS: 
 
After general anesthesia or intravenous sedation, for 24 hours or while taking prescription Narcotics: · Limit your activities · Do not drive and operate hazardous machinery · Do not make important personal or business decisions · Do  not drink alcoholic beverages · If you have not urinated within 8 hours after discharge, please contact your surgeon on call. Report the following to your surgeon: 
· Excessive pain, swelling, redness or odor of or around the surgical area · Temperature over 100.5 · Nausea and vomiting lasting longer than 4 hours or if unable to take medications · Any signs of decreased circulation or nerve impairment to extremity: change in color, persistent  numbness, tingling, coldness or increase pain · Any questions What to do at Home: 
Recommended activity: Activity as tolerated, If you experience any of the following symptoms with increased problems, please follow up with MD. 
 
 
*  Please give a list of your current medications to your Primary Care Provider. *  Please update this list whenever your medications are discontinued, doses are 
    changed, or new medications (including over-the-counter products) are added. *  Please carry medication information at all times in case of emergency situations. These are general instructions for a healthy lifestyle: No smoking/ No tobacco products/ Avoid exposure to second hand smoke Surgeon General's Warning:  Quitting smoking now greatly reduces serious risk to your health. Obesity, smoking, and sedentary lifestyle greatly increases your risk for illness A healthy diet, regular physical exercise & weight monitoring are important for maintaining a healthy lifestyle You may be retaining fluid if you have a history of heart failure or if you experience any of the following symptoms:  Weight gain of 3 pounds or more overnight or 5 pounds in a week, increased swelling in our hands or feet or shortness of breath while lying flat in bed. Please call your doctor as soon as you notice any of these symptoms; do not wait until your next office visit. Recognize signs and symptoms of STROKE: 
 
F-face looks uneven A-arms unable to move or move unevenly S-speech slurred or non-existent T-time-call 911 as soon as signs and symptoms begin-DO NOT go Back to bed or wait to see if you get better-TIME IS BRAIN. Warning Signs of HEART ATTACK Call 911 if you have these symptoms: 
? Chest discomfort. Most heart attacks involve discomfort in the center of the chest that lasts more than a few minutes, or that goes away and comes back. It can feel like uncomfortable pressure, squeezing, fullness, or pain. ? Discomfort in other areas of the upper body. Symptoms can include pain or discomfort in one or both arms, the back, neck, jaw, or stomach. ? Shortness of breath with or without chest discomfort. ? Other signs may include breaking out in a cold sweat, nausea, or lightheadedness. Don't wait more than five minutes to call [address]! Fast action can save your life. Calling 911 is almost always the fastest way to get lifesaving treatment. Emergency Medical Services staff can begin treatment when they arrive  up to an hour sooner than if someone gets to the hospital by car. The discharge information has been reviewed with the patient and spouse. The patient and spouse verbalized understanding. Discharge medications reviewed with the patient and spouse and appropriate educational materials and side effects teaching were provided. Discharge Instructions Attachments/References HEMODIALYSIS ACCESS (ENGLISH) HEMODIALYSIS: VASCULAR ACCESS SURGERY: GENERAL INFO (ENGLISH) HEMODIALYSIS ACCESS: POST-OP (ENGLISH) HEMODIALYSIS ACCESS: PRE-OP (ENGLISH) KIDNEY DIALYSIS (ENGLISH) Discharge Orders None [facility] Announcement We are excited to announce that we are making your provider's discharge notes available to you in [employer].   You will see these notes when they are completed and signed by the physician that discharged you from your recent hospital stay. If you have any questions or concerns about any information you see in appEatIT, please call the Health Information Department where you were seen or reach out to your Primary Care Provider for more information about your plan of care. Introducing [facility]! Nahun Mcnamara introduces appEatIT patient portal. Now you can access parts of your medical record, email your doctor's office, and request medication refills online. 1. In your internet browser, go to [email or web address]. [email or web address]/[email or web address] Elegance TV 2. Click on the First Time User? Click Here link in the Sign In box. You will see the New Member Sign Up page. 3. Enter your appEatIT Access Code exactly as it appears below. You will not need to use this code after youve completed the sign-up process. If you do not sign up before the expiration date, you must request a new code. · appEatIT Access Code: [email or web address] Expires: 8/22/2017  4:30 PM 
 
4. Enter the last four digits of your Social Security Number (xxxx) and Date of Birth (mm/dd/yyyy) as indicated and click Submit. You will be taken to the next sign-up page. 5. Create a appEatIT ID. This will be your appEatIT login ID and cannot be changed, so think of one that is secure and easy to remember. 6. Create a appEatIT password. You can change your password at any time. 7. Enter your Password Reset Question and Answer. This can be used at a later time if you forget your password. 8. Enter your e-mail address. You will receive e-mail notification when new information is available in [address] 9. Click Sign Up. You can now view and download portions of your medical record. 10. Click the Download Summary menu link to download a portable copy of your medical information.  
 
If you have questions, please visit the Frequently Asked Questions section of SnapLayout. Remember, MyChart is NOT to be used for urgent needs. For medical emergencies, dial 911. Now available from your iPhone and Android! General Information Please provide this summary of care documentation to your next provider. Patient Signature:  ____________________________________________________________ Date:  ____________________________________________________________  
  
Amarilis Barriga Provider Signature:  ____________________________________________________________ Date:  ____________________________________________________________ More Information Your Hemodialysis Access: Care Instructions Your Care Instructions Hemodialysis, or dialysis, is the use of a machine to remove wastes from your blood. You need it if your kidneys are not able to remove wastes on their own. A dialysis access is the place in your arm, or sometimes in your leg, where a doctor creates a blood vessel that carries a large flow of blood. When you have dialysis, two needles are placed in this blood vessel and are connected to the dialysis machine. Your blood flows out of one needle and into the machine to be cleaned. Then your cleaned blood flows back into your body through the other needle. Sometimes, a doctor makes a short-term access through a tube, called a catheter, placed in your neck, upper chest, or groin. Your doctor creates an access during a minor surgery. You need to take care of your access to keep it working and to prevent infection. Follow-up care is a key part of your treatment and safety. Be sure to make and go to all appointments, and call your doctor if you are having problems. Its also a good idea to know your test results and keep a list of the medicines you take. How can you care for yourself at home? · After your doctor creates an access, keep it dry for at least 2 days. · Squeeze a soft ball or other object as instructed after the access is placed. This will help blood flow through the access and help prevent blood clots. · After you have dialysis, check to see whether the access bleeds or swells. Let your doctor know if your arm bleeds or swells. · Do not lift anything heavy with the arm that has the access. · Do not bump your arm. · Do not wear tight clothing or jewelry over the access. · Do not sleep with your access arm under your body. · Have blood drawn or blood pressure taken from your other arm. · Keep the access clean and dry. · Do not put cream or lotion on or near the access. When should you call for help? Call your doctor now or seek immediate medical care if: 
· You have signs of infection, such as: 
¨ Increased pain, swelling, warmth, or redness around the access. ¨ Red streaks leading from the access. ¨ Pus draining from the access. ¨ Swollen lymph nodes in your neck, armpits, or groin. ¨ A fever. · You do not feel a pulse in your access. Watch closely for changes in your health, and be sure to contact your doctor if: 
· You have pain, swelling, or bleeding. Some pain or swelling is normal after surgery to create the access. But pain and swelling should get better over time. Where can you learn more? Go to http://ian-kenan.info/. Enter L169 in the search box to learn more about \"Your Hemodialysis Access: Care Instructions. \" Current as of: November 20, 2015 Content Version: 11.2 © 7072-7663 [employer]. Care instructions adapted under license by [employer] (which disclaims liability or warranty for this information). If you have questions about a medical condition or this instruction, always ask your healthcare professional. [place] [address] any warranty or liability for your use of this information. Learning About Hemodialysis and Vascular Access Surgery What are hemodialysis and vascular access? Hemodialysis is a way to remove wastes from the blood when your kidneys can no longer do the job. It is a lifesaving treatment when you have kidney failure. Hemodialysis is often called dialysis. Before you can start dialysis, your doctor will need to create a place where the blood can flow in and out of your body during your dialysis sessions. This site is called the vascular access. Your doctor will prepare the vascular access weeks to months before dialysis starts. It is important to get your access as soon as your doctor says to. This allows your access to heal before you use it. For dialysis to work best, the access needs to have a good, steady blood flow. It also must be sturdy since it will be used often, usually 3 times every week. Learning about vascular access and dialysis can help you take an active role in your treatment. Dialysis does not cure kidney disease, but it can help you live longer and feel better. You will need to follow your diet and treatment schedule carefully. How is vascular access surgery done? The vascular access is where the needles are put that draw the blood from your body and send it through tubes to the dialysis machine. This access is also used to return the clean blood that is sent back into your body. There are two basic types of permanent vascular access: · AV fistula. To make a fistula, your doctor will connect an artery to a vein in your arm. After the fistula heals, the dialysis needles can be put into it. Fistulas tend to be stronger and less likely to get infected than grafts. But they need to be prepared at least several months ahead of time. They are the best type of vascular access, but they are harder to create. · AV graft. To make a graft, your doctor will put a tube under the skin in your arm. The tube, or graft, connects an artery and a vein.  The dialysis needles can then be put into the graft for hemodialysis. A graft is a good choice if you have small veins or other problems. A graft can sometimes be used as soon as 1 to 3 weeks after placement. You will be asleep or get medicine to feel relaxed during the surgery. You will not feel pain. Your doctor will make a cut (incision) on the arm you use the least. If you are right-handed, the fistula or graft will probably be put in your left arm. If you are left-handed, it will probably be put in your right arm. Your doctor will close the incision with stitches. The incision will leave a scar that fades with time. If you need to start hemodialysis right away, your doctor may place a tube in a vein in your neck, chest, or leg. This is called a central venous catheter. The catheter can be used while your permanent access heals. Catheters have more problems than an AV fistula or AV graft, so they are not the best choice for permanent access. If you get an AV fistula, you will probably go home the same day as the surgery. If you get an AV graft, you may spend 1 night at the hospital. You will probably need to take 1 or 2 days off from work. How is dialysis done? Hemodialysis uses a man-made membrane called a dialyzer to clean your blood. You are connected to the dialyzer by tubes attached to your blood vessels through your vascular access. · Dialysis is done mainly by trained health workers who can watch for any problems. · It allows you to be in contact with other people having dialysis. This can help provide emotional support. · You can schedule your treatments in the evenings so you can keep working. · You may be able to do home dialysis, which gives you more control over your schedule. · You will need dialysis on a regular basis, usually 3 times a week. You will have to arrange your schedule to have these treatments. · Dialysis can cause side effects.  The most common side effects are low blood pressure and muscle cramps. These can often be treated easily. · Dialysis requires needle sticks, which bother some people. Others get used to it and even do the needle sticks themselves. What can you expect after you start dialysis? · Be sure to go to all of your dialysis sessions. Do not try to shorten or skip your sessions. You have a better chance of a longer and healthier life by getting your full treatment. · Your doctor or health care team will show you the steps you need to go through each day before, during, and after dialysis. Be sure to follow these steps. If you do not understand a step, talk to your team. 
· Your doctor and dietitian will help you design menus that follow your diet. Be sure to follow your diet guidelines. ¨ You will need to limit fluids and certain foods, such as salt (sodium), potassium, and phosphorus. ¨ You may need to follow a heart-healthy diet to keep the fat (cholesterol) in your blood under control. ¨ Ask your doctor how much protein you can have each day. Most people with chronic kidney disease need to limit protein. But you still need some protein to stay healthy. · Your doctor may recommend certain vitamins. But do not take any other medicine, including over-the-counter medicines, vitamins, and herbal products, without talking to your doctor first. 
· Do not smoke. Smoking raises your risk of many health problems, including more kidney damage. If you need help quitting, talk to your doctor about stop-smoking programs and medicines. These can increase your chances of quitting for good. · Do not take ibuprofen (Advil, Motrin) or naproxen (Aleve), or similar medicines, unless your doctor tells you to. They may make chronic kidney disease worse. You may be able to take acetaminophen (Tylenol). Follow-up care is a key part of your treatment and safety.  Be sure to make and go to all appointments, and call your doctor if you are having problems. It's also a good idea to know your test results and keep a list of the medicines you take. Where can you learn more? Go to http://ian-kenan.info/. Enter Y841 in the search box to learn more about \"Learning About Hemodialysis and Vascular Access Surgery. \" Current as of: November 20, 2015 Content Version: 11.2 © 8196-0812 [employer]. Care instructions adapted under license by [employer] (which disclaims liability or warranty for this information). If you have questions about a medical condition or this instruction, always ask your healthcare professional. [place] [address] any warranty or liability for your use of this information. Hemodialysis Access: What to Expect at [facility] Your Recovery Hemodialysis is a way to remove wastes from the blood when your kidneys can no longer do the job. It is not a cure, but it can help you live longer and feel better. It is a lifesaving treatment when you have kidney failure. Hemodialysis is often called dialysis. Your doctor created a place (called an access) in your arm for your blood to flow in and out of your body during your dialysis sessions. Your arm will probably be bruised and swollen. It may hurt. The cut (incision) may bleed. The pain and bleeding will get better over several days. You will probably need only over-the-counter pain medicine. You can reduce swelling by propping your arm on 1 or 2 pillows and keeping your elbow straight. You will have stitches. These may dissolve on their own, or your doctor will tell you when to come in to have them removed. You should also be able to return to work in a few days. You may feel some coolness or numbness in your hand. These feelings usually go away in a few weeks. Your doctor may suggest squeezing a soft object. This will strengthen your access and may make hemodialysis faster and easier. You should always be able to feel blood rushing through the fistula or graft. It feels like a slight vibration when you put your fingers on the skin over the fistula or graft. This feeling is called a thrill or pulse. This care sheet gives you a general idea about how long it will take for you to recover. But each person recovers at a different pace. Follow the steps below to get better as quickly as possible. How can you care for yourself at home? Activity · Rest when you feel tired. Getting enough sleep will help you recover. Do not lie on or sleep on the arm with the access. · Avoid activities such as washing windows or gardening that put stress on the arm with the access. · You may use your arm, but do not lift anything that weighs more than about 15 pounds. This may include a child, heavy grocery bags, a heavy briefcase or backpack, cat litter or dog food bags, or a vacuum . · You can shower, but keep the access dry for the first 2 days. Cover the area with a plastic bag to keep it dry. · Do not soak or scrub the incision until it has healed. · Wear an arm guard to protect the area if you play sports or work with your arms. · You may drive when your doctor says it is okay. This is usually in 1 to 2 days. · Most people are able to return to work about 1 or 2 days after surgery. Diet · Follow an eating plan that is good for your kidneys. A registered dietitian can help you make a meal plan that is right for you. You may need to limit protein, salt, fluids, and certain foods. Medicines · Your doctor will tell you if and when you can restart your medicines. He or she will also give you instructions about taking any new medicines. · If you take blood thinners, such as warfarin (Coumadin), clopidogrel (Plavix), or aspirin, be sure to talk to your doctor. He or she will tell you if and when to start taking those medicines again.  Make sure that you understand exactly what your doctor wants you to do. · Take pain medicines exactly as directed. ¨ If the doctor gave you a prescription medicine for pain, take it as prescribed. ¨ If you are not taking a prescription pain medicine, ask your doctor if you can take acetaminophen (Tylenol). Do not take ibuprofen (Advil, Motrin) or naproxen (Aleve), or similar medicines, unless your doctor tells you to. They may make chronic kidney disease worse. ¨ Do not take two or more pain medicines at the same time unless the doctor told you to. Many pain medicines have acetaminophen, which is Tylenol. Too much acetaminophen (Tylenol) can be harmful. · If you think your pain medicine is making you sick to your stomach: 
¨ Take your medicine after meals (unless your doctor has told you not to). ¨ Ask your doctor for a different pain medicine. · If your doctor prescribed antibiotics, take them as directed. Do not stop taking them just because you feel better. You need to take the full course of antibiotics. Incision care · Keep the area dry for 2 days. After 2 days, wash the area with soap and water every day, and always before dialysis. · Do not soak or scrub the incision until it has healed. · If you have a bandage, change it every day or as your doctor recommends. Your doctor will tell you when you can remove it. Exercise · Squeeze a soft ball or other object as your doctor tells you. This will help blood flow through the access and help prevent blood clots. Elevation · Prop up the sore arm on a pillow anytime you sit or lie down during the next 3 days. Try to keep it above the level of your heart. This will help reduce swelling. Other instructions · Every day, check your access for a pulse or thrill in the fistula or graft area. A thrill is a vibration. To feel a pulse or thrill, place the first two fingers of your hand over the access. · Do not bump your arm. · Do not wear tight clothing, jewelry, or anything else that may squeeze the access. · Use your other arm to have blood drawn or blood pressure taken. · Do not put cream or lotion on or near the access. · Make sure all doctors you deal with know you have a vascular access. Follow-up care is a key part of your treatment and safety. Be sure to make and go to all appointments, and call your doctor if you are having problems. It's also a good idea to know your test results and keep a list of the medicines you take. When should you call for help? Call 911 anytime you think you may need emergency care. For example, call if: 
· You passed out (lost consciousness). · You have severe trouble breathing. · You have sudden chest pain and shortness of breath, or you cough up blood. · You have a rapid pulse or heartbeat. Call your doctor now or seek immediate medical care if: 
· Your hand or arm is cold or dark-colored. · You have sudden bulging around your access. · You have no pulse or thrill in your access, or you hear no sound of blood in your access. · Bleeding after dialysis lasts longer than usual. 
· You have severe pain that does not get better after you take pain medicine. · You have a fever over 100°F. 
· You have loose stitches, or your incision comes open. · You are bleeding from the incision more than you had been. · You have signs of infection, such as: 
¨ Increased pain, swelling, warmth, or redness. ¨ Red streaks leading from the incision. ¨ Pus draining from the incision. ¨ Swollen lymph nodes in your neck, armpits, or groin. ¨ A fever. Watch closely for changes in your health, and be sure to contact your doctor if you have any problems. Where can you learn more? Go to http://ian-kenan.info/. Enter P616 in the search box to learn more about \"Hemodialysis Access: What to Expect at Home. \" Current as of: November 16, 2016 Content Version: 11.2 © 7259-9066 [employer]. Care instructions adapted under license by [employer] (which disclaims liability or warranty for this information). If you have questions about a medical condition or this instruction, always ask your healthcare professional. [address] any warranty or liability for your use of this information. Hemodialysis Access: Before Your Surgery What is a hemodialysis access? Hemodialysis is a way to remove wastes from the blood when your kidneys can no longer do the job. It is not a cure, but it can help you live longer and feel better. It is a lifesaving treatment when you have kidney failure. Hemodialysis is often called dialysis. Before you can start dialysis, a doctor will create a place where the blood can flow in and out of your body during dialysis. This is called the hemodialysis (or vascular) access. There are two basic types of permanent vascular access. · AV fistula: This is the best type of access, but it is harder to create. To make a fistula, a doctor will connect an artery to a vein, usually in your arm. Fistulas tend to be stronger and less likely to get infected than grafts. But they need to be prepared several months ahead of time. After that, the dialysis needles can be put into the fistula. · AV graft: This is a good choice if you have small veins or other problems. A doctor will put a tube under the skin in your arm. This tube is the graft. It connects an artery and a vein. The dialysis needles can then be put into the graft. A graft can sometimes be used as soon as 2 weeks after placement. You will get medicine to numb the area and help you feel relaxed during the surgery. The doctor will make a cut on the forearm of the arm you use the least. This cut is called an incision. The doctor will close it with stitches. The incision will leave a scar that fades with time. You will probably go home the same day as the surgery. You will probably need to take 1 or 2 days off from work after the surgery. Follow-up care is a key part of your treatment and safety. Be sure to make and go to all appointments, and call your doctor if you are having problems. It's also a good idea to know your test results and keep a list of the medicines you take. What happens before surgery? Surgery can be stressful. This information will help you understand what you can expect. And it will help you safely prepare for surgery. Preparing for surgery · Understand exactly what surgery is planned, along with the risks, benefits, and other options. · Tell your doctors ALL the medicines, vitamins, supplements, and herbal remedies you take. Some of these can increase the risk of bleeding or interact with anesthesia. · If you take blood thinners, such as warfarin (Coumadin), clopidogrel (Plavix), or aspirin, be sure to talk to your doctor. He or she will tell you if you should stop taking these medicines before your surgery. Make sure that you understand exactly what your doctor wants you to do. · Your doctor will tell you which medicines to take or stop before your surgery. You may need to stop taking certain medicines a week or more before surgery. So talk to your doctor as soon as you can. · If you have an advance directive, let your doctor know. It may include a living will and a durable power of  for health care. Bring a copy to the hospital. If you don't have one, you may want to prepare one. It lets your doctor and loved ones know your health care wishes. Doctors advise that everyone prepare these papers before any type of surgery or procedure. What happens on the day of surgery? · Follow the instructions exactly about when to stop eating and drinking. If you don't, your surgery may be canceled.  If your doctor told you to take your medicines on the day of surgery, take them with only a sip of water. · Take a bath or shower before you come in for your surgery. Do not apply lotions, perfumes, deodorants, or nail polish. · Do not shave the surgical site yourself. · Take off all jewelry and piercings. And take out contact lenses, if you wear them. At the hospital or surgery center · Bring a picture ID. · The area for surgery is often marked to make sure there are no errors. · You will be kept comfortable and safe by your anesthesia provider. You may get medicine that relaxes you or puts you in a light sleep. The area being worked on will be numb. Going home · Be sure you have someone to drive you home. Anesthesia and pain medicine make it unsafe for you to drive. · You will be given more specific instructions about recovering from your surgery. They will cover things like diet, wound care, follow-up care, driving, and getting back to your normal routine. When should you call your doctor? · You have questions or concerns. · You don't understand how to prepare for your surgery. · You become ill before the surgery (such as fever, flu, or a cold). · You need to reschedule or have changed your mind about having the surgery. Where can you learn more? Go to http://ian-kenan.info/. Enter X307 in the search box to learn more about \"Hemodialysis Access: Before Your Surgery. \" Current as of: November 20, 2015 Content Version: 11.2 © 4322-2439 [employer]. Care instructions adapted under license by [employer] (which disclaims liability or warranty for this information). If you have questions about a medical condition or this instruction, always ask your healthcare professional. [place] [address] any warranty or liability for your use of this information. Kidney Dialysis: Care Instructions Your Care Instructions Dialysis is a process that filters wastes from the blood when your kidneys can no longer do the job. It is not a cure, but it can help you live longer and feel better. It is a lifesaving treatment when you have kidney failure. Normal kidneys work 24 hours a day to clean wastes from your blood. Your kidneys are not able to do this job, so a process called dialysis will do some of the work for your kidneys. You and your doctor will decide which type of dialysis you should have. Peritoneal dialysis uses the lining of your belly (peritoneum) to filter your blood. You can do it at home, on a daily basis. Hemodialysis uses a man-made filter called a dialyzer to clean your blood. Most people need to go to a hospital or clinic 3 days a week for several hours each time. Sometimes hemodialysis can be done at home. It is normal to have questions about your treatment, and you have a right to know what is happening to you. Learning about dialysis can help you take an active role in your treatment. Dialysis does not cure kidney disease, but it can help you live longer and feel better. You will need to follow your diet and treatment schedule carefully. Follow-up care is a key part of your treatment and safety. Be sure to make and go to all appointments, and call your doctor if you are having problems. It's also a good idea to know your test results and keep a list of the medicines you take. What do you need to know about peritoneal dialysis? Peritoneal dialysis uses the lining of your belly (or peritoneal membrane) to filter your blood. Before you can begin peritoneal dialysis, your doctor will need to place a thin tube called a catheter in your belly. This is the dialysis access. · Peritoneal dialysis can be done at home or in any clean place. You may be able to do it while you sleep. · You can do it by yourself. You do not have to rely on help from others. · You can do it at the times you choose as long as you do the right number of treatments. · It has to be done every day of the week. · Some people find it hard to do all the required steps. · It increases your chance for a serious infection of the lining of the belly (peritoneum). What do you need to know about hemodialysis? Hemodialysis uses a man-made membrane called a dialyzer to clean your blood. You are connected to the dialyzer by tubes attached to your blood vessels. Before you start hemodialysis, your doctor will create a site where the blood can flow in and out of your body during your dialysis sessions. This site is called the vascular access. It may be a fistula, made by connecting an artery and a vein. Or it may be a graft, which is a tube implanted under your skin. · Hemodialysis is done mainly by trained health workers who can watch for any problems. · It allows you to be in contact with other people having dialysis. This can help provide emotional support. · You can schedule your treatments in the evenings so you can keep working. · You may be able to do home hemodialysis, which gives you more control over your schedule. · It usually needs to be done on a set schedule 3 times a week. · It can cause side effects. The most common side effects are low blood pressure and muscle cramps. These can often be treated easily. · It requires needle sticks during every treatment, which bothers some people. Others get used to it and even do the needle sticks themselves. How can you care for yourself at home? · Be sure to have all of your dialysis sessions. Do not try to shorten or skip your sessions. You have a better chance of a longer and healthier life by getting your full treatment. · Your doctor or health care team will show you the steps you need to go through each day before, during, and after dialysis. Be sure to follow these steps.  If you do not understand a step, talk to your team. 
 · Your doctor and dietitian will help you design menus that follow your diet. Be sure to follow your diet guidelines. ¨ You will need to limit fluids and certain foods that contain salt (sodium), potassium, and phosphorus. ¨ You may need to follow a heart-healthy diet to keep the fat (cholesterol) in your blood under control. ¨ You may need higher levels of protein in your diet. · Your doctor may recommend certain vitamins. But do not take any other medicine, including over-the-counter medicines, vitamins, and herbal products, without talking to your doctor first. 
· Do not smoke. Smoking raises your risk of many health problems, including more kidney damage. If you need help quitting, talk to your doctor about stop-smoking programs and medicines. These can increase your chances of quitting for good. · Do not take ibuprofen (Advil, Motrin), naproxen (Aleve), or similar medicines, unless your doctor tells you to. These medicines may make kidney problems worse. When should you call for help? Call 911 anytime you think you may need emergency care. For example, call if: 
· You passed out (lost consciousness). · You have severe shortness of breath. Call your doctor now or seek immediate medical care if: 
· You have swelling in your hands or feet. · You are dizzy or lightheaded, or you feel like you may faint. · You have an unusual weight gain. · You have trembling or trouble thinking clearly. · You have a fever. Watch closely for changes in your health, and be sure to contact your doctor if: 
· You have any problems with dialysis or your diet. Where can you learn more? Go to http://ian-kenan.info/. Enter X965 in the search box to learn more about \"Kidney Dialysis: Care Instructions. \" Current as of: November 20, 2015 Content Version: 11.2 © 8634-7046 [employer].  Care instructions adapted under license by [employer] (which disclaims liability or warranty for this information). If you have questions about a medical condition or this instruction, always ask your healthcare professional. [place] [address] any warranty or liability for your use of this information.

## 2017-05-28 NOTE — IP AVS SNAPSHOT
Current Discharge Medication List  
  
START taking these medications Dose & Instructions Dispensing Information Comments Morning Noon Evening Bedtime  
 calcium acetate 667 mg Cap Commonly known as:  PHOSLO Your last dose was: Your next dose is:    
   
   
 Dose:  1 Cap Take 1 Cap by mouth three (3) times daily (with meals). Quantity:  90 Cap Refills:  0  
     
   
   
   
  
 escitalopram oxalate 5 mg tablet Commonly known as:  Becky Woodruff Your last dose was: Your next dose is:    
   
   
 Dose:  5 mg Take 1 Tab by mouth every evening. Indications: major depressive disorder Quantity:  30 Tab Refills:  0  
     
   
   
   
  
 tamsulosin 0.4 mg capsule Commonly known as:  FLOMAX Your last dose was: Your next dose is:    
   
   
 Dose:  0.4 mg Take 1 Cap by mouth daily. Quantity:  30 Cap Refills:  0 CONTINUE these medications which have NOT CHANGED Dose & Instructions Dispensing Information Comments Morning Noon Evening Bedtime  
 aspirin delayed-release 81 mg tablet Your last dose was: Your next dose is: Take  by mouth daily. Refills:  0 Biotin 2,500 mcg Cap Your last dose was: Your next dose is: Take  by mouth. Refills:  0  
     
   
   
   
  
 bumetanide 2 mg tablet Commonly known as:  Ok Huffman Your last dose was: Your next dose is:    
   
   
 Dose:  2 mg Take 2 mg by mouth two (2) times a day. Refills:  0  
     
   
   
   
  
 calcitRIOL 0.25 mcg capsule Commonly known as:  ROCALTROL Your last dose was: Your next dose is:    
   
   
 Dose:  0.25 mcg Take 0.25 mcg by mouth daily. Refills:  0 LEVEMIR FLEXPEN 100 unit/mL (3 mL) Inpn Generic drug:  insulin detemir Your last dose was: Your next dose is: Dose:  10 Units 10 Units by SubCUTAneous route nightly. Indications: type 2 diabetes mellitus Refills:  0 LEVOXYL 125 mcg tablet Generic drug:  levothyroxine Your last dose was: Your next dose is: Take  by mouth Daily (before breakfast). Refills:  0  
     
   
   
   
  
 metOLazone 5 mg tablet Commonly known as:  Lavenia Jamilamaul Your last dose was: Your next dose is: Take  by mouth daily. Refills:  0  
     
   
   
   
  
 SYMBICORT 160-4.5 mcg/actuation HFA inhaler Generic drug:  budesonide-formoterol Your last dose was: Your next dose is:    
   
   
 Dose:  2 Puff Take 2 Puffs by inhalation two (2) times a day. Refills:  0  
     
   
   
   
  
 TYLENOL-CODEINE #3 300-30 mg per tablet Generic drug:  acetaminophen-codeine Your last dose was: Your next dose is:    
   
   
 Dose:  1 Tab Take 1 Tab by mouth every four (4) hours as needed for Pain. Refills:  0 STOP taking these medications   
 amoxicillin-clavulanate 875-125 mg per tablet Commonly known as:  AUGMENTIN  
   
  
 gabapentin 300 mg capsule Commonly known as:  NEURONTIN Where to Get Your Medications These medications were sent to [address] [place] [address] 110 2018 [address] [place] [address] AT 1000 [facility] [address] [place] [address] Way [address] Phone:  [phone]  
  escitalopram oxalate 5 mg tablet Information on where to get these meds will be given to you by the nurse or doctor. ! Ask your nurse or doctor about these medications  
  calcium acetate 667 mg Cap  
 tamsulosin 0.4 mg capsule

## 2017-05-28 NOTE — ED NOTES
TRANSFER - OUT REPORT:    Verbal report given to Kiesha RN(name) on Robyn Vegas  being transferred to  608(unit) for routine progression of care       Report consisted of patients Situation, Background, Assessment and   Recommendations(SBAR). Information from the following report(s) ED Summary was reviewed with the receiving nurse. Lines:       Opportunity for questions and clarification was provided.       Patient transported with:   Patient-specific medications from Pharmacy

## 2017-05-28 NOTE — ED TRIAGE NOTES
Reports history of renal failure. States has only urinated x2 in the last 48 hours. States very weak.

## 2017-05-29 PROBLEM — N18.9 ACUTE ON CHRONIC KIDNEY FAILURE (HCC): Status: ACTIVE | Noted: 2017-01-01

## 2017-05-29 PROBLEM — R60.1 ANASARCA: Status: ACTIVE | Noted: 2017-01-01

## 2017-05-29 PROBLEM — N17.9 ACUTE ON CHRONIC KIDNEY FAILURE (HCC): Status: ACTIVE | Noted: 2017-01-01

## 2017-05-29 NOTE — CONSULTS
Urology Consult    Subjective:     Date of Consultation:  May 29, 2017    Referring Physician: Shakira Chandler    Reason for Consultation: Urinary retention    History of Present Illness: Patient is 69 y/o male w/ CKD 5 and last Cr 4.8 2 weeks ago but noted to have edema and metolazone added to his regimen. Recently seen by [facility] surgery due to non healing LE ulcers and possible vascular access for HD though he has not seen a nephrologist. Family has noted for last few days he has been lethargic, slurring speech that comes and goes. He has neuropathic pain in legs and given gabapentin though he only took a few doses. Aslo recenlty on bactrim and augmentin for leg wounds. Also noted to not urinate for 12 hours but with davenport in the ED put out 800ml in hour. No change in chronic sob. The patient was treated for early stage prostate cancer about 10 years ago with radioactive seed placement followed by external beam radiation therapy. According to the patient's wife his PSA has been quite low. He has not had particularly bothersome voiding symptoms will will occasionally have episodes of urge incontinence. He would appear that some of this is related to lack of mobility. Currently he is not on any medications for lower urinary tract symptoms but was on Flomax when he was treated for prostate cancer. He tolerated this well.     Past Medical History:   Diagnosis Date    Cancer [facility])     prostate    Cellulitis     Chronic kidney disease     Chronic obstructive pulmonary disease (HCC)     Chronic pain     Depression     Diabetes ([facility] [address]     Edema     Numbness     knee's down    Thyroid disease     Vitamin D deficiency       Past Surgical History:   Procedure Laterality Date    HX APPENDECTOMY      HX CHOLECYSTECTOMY      HX HERNIA REPAIR      HX PROSTATECTOMY        Family History   Problem Relation Age of Onset    No Known Problems Mother     Stroke Father       Social History   Substance Use Topics    Smoking status: Former Smoker    Smokeless tobacco: Never Used    Alcohol use Not on file     No Known Allergies   Prior to Admission medications    Medication Sig Start Date End Date Taking? Authorizing Provider   insulin detemir (LEVEMIR FLEXPEN) 100 unit/mL (3 mL) inpn 10 Units by SubCUTAneous route nightly. Indications: type 2 diabetes mellitus   Yes Historical Provider   metOLazone (ZAROXOLYN) 5 mg tablet Take  by mouth daily. Historical Provider   bumetanide (BUMEX) 2 mg tablet Take 2 mg by mouth two (2) times a day. Historical Provider   budesonide-formoterol (SYMBICORT) 160-4.5 mcg/actuation HFA inhaler Take 2 Puffs by inhalation two (2) times a day. Historical Provider   levothyroxine (LEVOXYL) 125 mcg tablet Take  by mouth Daily (before breakfast). Historical Provider   calcitRIOL (ROCALTROL) 0.25 mcg capsule Take 0.25 mcg by mouth daily. Historical Provider   aspirin delayed-release 81 mg tablet Take  by mouth daily. Historical Provider   Biotin 2,500 mcg cap Take  by mouth. Historical Provider   acetaminophen-codeine (TYLENOL-CODEINE #3) 300-30 mg per tablet Take 1 Tab by mouth every four (4) hours as needed for Pain. Historical Provider   amoxicillin-clavulanate (AUGMENTIN) 875-125 mg per tablet Take 1 Tab by mouth every twelve (12) hours. Indications: DIABETIC FOOT INFECTION 17   Swapna Jiménez MD   gabapentin (NEURONTIN) 300 mg capsule Take 1 Cap by mouth three (3) times daily. Indications: NEUROPATHIC PAIN 17   Swapna Jiménez MD         Review of Systems:  The patient denies fever or chills. There is no shortness of breath or chest pain. He does report swelling of the scrotum and lower extremities.     Objective:     Patient Vitals for the past 8 hrs:   BP Temp Pulse Resp SpO2   17 1048 113/54 - 63 18 93 %   17 0735 109/49 98.2 °F (36.8 °C) 66 18 98 %     Temp (24hrs), Av.4 °F (36.3 °C), Min:95.7 °F (35.4 °C), Max:98.2 °F (36.8 °C)      Intake and Output: 05/27 1901 - 05/29 0700  In: -   Out: 650 [Urine:650]    Physical exam: Patient is in no distress. The abdomen is morbidly obese. There is no palpable mass. The phallus is retracted  within the pannus. His scrotum is significantly swollen and I cannot palpate the testicles. There is at least 2+ lower extremity edema below the knees. Assessment:     Principal Problem:    Acute on chronic kidney failure ([address] (5/29/2017)    Active Problems:    Foot ulcer, left ([address] (5/25/2017)      PAD (peripheral artery disease) ([address] (5/25/2017)      Encephalopathy (5/28/2017)      CKD (chronic kidney disease) stage 5, GFR less than 15 ml/min ([facility]) (5/28/2017)      Uremia (5/28/2017)      Anemia of chronic renal failure, stage 5 ([facility]) (5/28/2017)      COPD (chronic obstructive pulmonary disease) ([facility] [address] (5/28/2017)      Morbid obesity ([address] (5/28/2017)      Hyperkalemia (5/28/2017)      DM (diabetes mellitus) ([address] (5/28/2017)      TONJA on CPAP (5/28/2017)      Anasarca (5/29/2017)    Urinary retention          Plan:     Continue Chen for 5-7 days. I'll start Flomax 0.4 mg 1 by mouth daily. He and his wife are cautioned regarding the risks of dizziness and nasal stuffiness.     Signed By: Jerald Stafford MD                         May 29, 2017

## 2017-05-29 NOTE — PROGRESS NOTES
IV blew, first shift stuck him multiple times. Skin fragile, arms edematous. Family request pt not to be stuck again. Only receiving Bumex IV, so talked to Dr. Cathi Fallon. He said it was okay to switch Bumex to 2 mg PO q 12 hours and to keep IV out.

## 2017-05-29 NOTE — PROGRESS NOTES
Hospitalist Progress Note    2017  Admit Date: 2017  3:57 PM   NAME: Lucia Garcia   :  1940   MRN:  [id number]   Attending: Kelly Guerin MD  PCP:  Keri Domínguez MD    SUBJECTIVE:   Mr. Lucia Garcia is a 67 yo M admitted 17 for worsening renal failure/uremia, volume overload, and confusion. He was seen with his wife and daughter at bedside. He reports his legs and feet hurt. Discussed that gabapentin may be able to be added back at a lower dose when dialysis started. Seen by nephrology and plan for perm cath tomorrow and starting HD. Aside from leg and foot pain, Mr. Myrna Hanks denies concerns. Review of Systems negative with exception of pertinent positives noted above  PHYSICAL EXAM     Visit Vitals    /49 (BP 1 Location: Left arm, BP Patient Position: At rest)    Pulse 66    Temp 98.2 °F (36.8 °C)    Resp 18    Ht 5' 11\" (1.803 m)    Wt (!) 167.8 kg (370 lb)    SpO2 98%    BMI 51.6 kg/m2      Temp (24hrs), Av.4 °F (36.3 °C), Min:95.7 °F (35.4 °C), Max:98.2 °F (36.8 °C)    Oxygen Therapy  O2 Sat (%): 98 % (17 0735)  Pulse via Oximetry: 65 beats per minute (17)  O2 Device: CPAP mask (17)  O2 Flow Rate (L/min): 2 l/min (17 0213)    Intake/Output Summary (Last 24 hours) at 17 0907  Last data filed at 17 0453   Gross per 24 hour   Intake                0 ml   Output              650 ml   Net             -650 ml      General: No acute distress, morbidly obese  Lungs:  CTA Bilaterally.    Heart:  Regular rate and rhythm,  No murmur, rub, or gallop  Abdomen: Soft, Non distended, Non tender, Positive bowel sounds  Extremities: 2-+ leg edema, ulcers noted on dorsal aspect of feet   Neurologic:  No focal deficits    Recent Results (from the past 24 hour(s))   CBC WITH AUTOMATED DIFF    Collection Time: 17  3:57 PM   Result Value Ref Range    WBC 6.8 4.3 - 11.1 K/uL    RBC 2.73 (L) 4.23 - 5.67 M/uL    HGB 8.2 (L) 13.6 - 17.2 g/dL    HCT 25.9 (L) 41.1 - 50.3 %    MCV 94.9 79.6 - 97.8 FL    MCH 30.0 26.1 - 32.9 PG    MCHC 31.7 31.4 - 35.0 g/dL    RDW 13.5 11.9 - 14.6 %    PLATELET 878 259 - 239 K/uL    MPV 9.9 (L) 10.8 - 14.1 FL    DF AUTOMATED      NEUTROPHILS 80 (H) 43 - 78 %    LYMPHOCYTES 9 (L) 13 - 44 %    MONOCYTES 9 4.0 - 12.0 %    EOSINOPHILS 1 0.5 - 7.8 %    BASOPHILS 0 0.0 - 2.0 %    IMMATURE GRANULOCYTES 0.6 0.0 - 5.0 %    ABS. NEUTROPHILS 5.5 1.7 - 8.2 K/UL    ABS. LYMPHOCYTES 0.6 0.5 - 4.6 K/UL    ABS. MONOCYTES 0.6 0.1 - 1.3 K/UL    ABS. EOSINOPHILS 0.1 0.0 - 0.8 K/UL    ABS. BASOPHILS 0.0 0.0 - 0.2 K/UL    ABS. IMM. GRANS. 0.0 0.0 - 0.5 K/UL   METABOLIC PANEL, COMPREHENSIVE    Collection Time: 05/28/17  3:57 PM   Result Value Ref Range    Sodium 134 (L) 136 - 145 mmol/L    Potassium 5.3 (H) 3.5 - 5.1 mmol/L    Chloride 95 (L) 98 - 107 mmol/L    CO2 23 21 - 32 mmol/L    Anion gap 16 7 - 16 mmol/L    Glucose 205 (H) 65 - 100 mg/dL     (H) 8 - 23 MG/DL    Creatinine 8.18 (H) 0.8 - 1.5 MG/DL    GFR est AA 8 (L) >60 ml/min/1.73m2    GFR est non-AA 7 (L) >60 ml/min/1.73m2    Calcium 7.6 (L) 8.3 - 10.4 MG/DL    Bilirubin, total 0.3 0.2 - 1.1 MG/DL    ALT (SGPT) 23 12 - 65 U/L    AST (SGOT) 10 (L) 15 - 37 U/L    Alk. phosphatase 107 50 - 136 U/L    Protein, total 7.4 6.3 - 8.2 g/dL    Albumin 2.8 (L) 3.2 - 4.6 g/dL    Globulin 4.6 (H) 2.3 - 3.5 g/dL    A-G Ratio 0.6 (L) 1.2 - 3.5     BNP    Collection Time: 05/28/17  3:57 PM   Result Value Ref Range     pg/mL   LIPASE    Collection Time: 05/28/17  3:57 PM   Result Value Ref Range    Lipase 101 73 - 393 U/L   EKG, 12 LEAD, INITIAL    Collection Time: 05/28/17  6:22 PM   Result Value Ref Range    Ventricular Rate 66 BPM    Atrial Rate 105 BPM    QRS Duration 102 ms    Q-T Interval 482 ms    QTC Calculation (Bezet) 505 ms    Calculated R Axis 28 degrees    Calculated T Axis -133 degrees    Diagnosis       !! AGE AND GENDER SPECIFIC ECG ANALYSIS !!   Atrial fibrillation  Low voltage QRS  Incomplete right bundle branch block  ST & T wave abnormality, consider inferior ischemia or digitalis effect  Prolonged QT  Abnormal ECG  When compared with ECG of 17-NOV-2014 22:11,  Atrial fibrillation has replaced Sinus rhythm  Incomplete right bundle branch block is now Present     POC LACTIC ACID    Collection Time: 05/28/17  6:30 PM   Result Value Ref Range    Lactic Acid (POC) 1.7 0.5 - 1.9 mmol/L   URINALYSIS W/ RFLX MICROSCOPIC    Collection Time: 05/28/17  6:49 PM   Result Value Ref Range    Color YELLOW      Appearance CLOUDY      Specific gravity 1.013 1.001 - 1.023      pH (UA) 5.0 5.0 - 9.0      Protein 100 (A) NEG mg/dL    Glucose 100 mg/dL    Ketone NEGATIVE  NEG mg/dL    Bilirubin NEGATIVE  NEG      Blood NEGATIVE  NEG      Urobilinogen 0.2 0.2 - 1.0 EU/dL    Nitrites NEGATIVE  NEG      Leukocyte Esterase TRACE (A) NEG      WBC 0-3 0 /hpf    Bacteria 0 0 /hpf    Amorphous Crystals TRACE 0     POC TROPONIN-I    Collection Time: 05/28/17  6:49 PM   Result Value Ref Range    Troponin-I (POC) 0.04 0.0 - 0.08 ng/ml   GLUCOSE, POC    Collection Time: 05/28/17 10:44 PM   Result Value Ref Range    Glucose (POC) 184 (H) 65 - 100 mg/dL   CBC W/O DIFF    Collection Time: 05/29/17  7:01 AM   Result Value Ref Range    WBC 5.7 4.3 - 11.1 K/uL    RBC 2.54 (L) 4.23 - 5.67 M/uL    HGB 7.6 (L) 13.6 - 17.2 g/dL    HCT 24.4 (L) 41.1 - 50.3 %    MCV 96.1 79.6 - 97.8 FL    MCH 29.9 26.1 - 32.9 PG    MCHC 31.1 (L) 31.4 - 35.0 g/dL    RDW 13.4 11.9 - 14.6 %    PLATELET 516 612 - 392 K/uL    MPV 9.4 (L) 10.8 - 14.1 FL   RENAL FUNCTION PANEL    Collection Time: 05/29/17  7:01 AM   Result Value Ref Range    Sodium 134 (L) 136 - 145 mmol/L    Potassium 5.0 3.5 - 5.1 mmol/L    Chloride 95 (L) 98 - 107 mmol/L    CO2 25 21 - 32 mmol/L    Anion gap 14 7 - 16 mmol/L    Glucose 132 (H) 65 - 100 mg/dL     (H) 8 - 23 MG/DL    Creatinine 8.25 (H) 0.8 - 1.5 MG/DL    GFR est AA 8 (L) >60 ml/min/1.73m2    GFR est non-AA 7 (L) >60 ml/min/1.73m2    Calcium 7.4 (L) 8.3 - 10.4 MG/DL    Phosphorus 8.7 (H) 2.3 - 3.7 MG/DL    Albumin 2.6 (L) 3.2 - 4.6 g/dL   GLUCOSE, POC    Collection Time: 05/29/17  7:40 AM   Result Value Ref Range    Glucose (POC) 158 (H) 65 - 100 mg/dL     XR FOOT LT MIN 3 V   Final Result   IMPRESSION: No periostitis or erosions. Extensive vascular calcifications. XR CHEST PORT   Final Result   IMPRESSION:  Cardiomegaly suspected. Mild interstitial prominence which could be   acute or chronic. Minimal band of atelectasis left midlung zone. CT UROGRAM WO CONT   Final Result   IMPRESSION:  Negative for radiopaque urinary tract calculi or evidence of   obstruction. Other findings as above. CT HEAD WO CONT   Final Result   IMPRESSION:  Negative for acute intracranial abnormality. Chronic changes. US RETROPERITONEUM COMP    (Results Pending)       [address] Problems    Diagnosis Date Noted    Anasarca 05/29/2017    Acute on chronic kidney failure ([address] 05/29/2017    Encephalopathy 05/28/2017    CKD (chronic kidney disease) stage 5, GFR less than 15 ml/min ([facility]) 05/28/2017    Uremia 05/28/2017    Anemia of chronic renal failure, stage 5 ([address] 05/28/2017    COPD (chronic obstructive pulmonary disease) ([address] 05/28/2017    Morbid obesity ([address] 05/28/2017    Hyperkalemia 05/28/2017    DM (diabetes mellitus) ([address] 05/28/2017    TONJA on CPAP 05/28/2017    Foot ulcer, left ([address] 05/25/2017    PAD (peripheral artery disease) ([address] 05/25/2017     Plan:  · ESRD- start hemodialysis per nephrology. · Encephalopathy/AMS- improved. Likley related to worsening renal failure and recent start of gabapentin. · Leg pain/neuropathy- prn norco for now. May add back lower dose of gabapentin soon. · DM- blood sugars decent. Continue current regimen. DVT Prophylaxis: Heparin    Signed By: Wayne Hurley.  Cande Saunders MD     May 29, 2017

## 2017-05-29 NOTE — PROGRESS NOTES
[facility] [facility] Consult    Subjective:     Isatu Albarran is a 68 y.o.  male who is being seen for progressive CKD. This is a very pleasant gentleman who was scheduled to see Dr Naomi Roman in the office this week but unfortunately landed in the hospital. He has had progressive CKD presumably secondary to DM and has been followed by a Nephrologist in [facility]. He was first told he needed permanent access 4-5 years ago. He was lost to f/u and again told about a year and a hald ago. His baseline creatinine is 4.8. He has volume issues and his diuretics have been increased over the past 2 months with some improvement. When he came in with AMS, his bladder had 600 cc of urine, relieved by davenport placement. He recently had been on Bactrim as well for foot ulcers followed by Augmentin. Hi mental status has improved since admission.       Past Medical History:   Diagnosis Date    Cancer [facility])     prostate    Cellulitis     Chronic kidney disease     Chronic obstructive pulmonary disease (HCC)     Chronic pain     Depression     Diabetes [facility] [address]     Edema     Numbness     knee's down    Thyroid disease     Vitamin D deficiency       Past Surgical History:   Procedure Laterality Date    HX APPENDECTOMY      HX CHOLECYSTECTOMY      HX HERNIA REPAIR      HX PROSTATECTOMY       Family History   Problem Relation Age of Onset    No Known Problems Mother     Stroke Father       Social History   Substance Use Topics    Smoking status: Former Smoker    Smokeless tobacco: Never Used    Alcohol use Not on file       Current Facility-Administered Medications   Medication Dose Route Frequency Provider Last Rate Last Dose    bumetanide (BUMEX) injection 2 mg  2 mg IntraVENous Q12H Val Wooten MD        metOLazone (ZAROXOLYN) tablet 5 mg  5 mg Oral BID Val Wooten MD        tamsulosin (FLOMAX) capsule 0.4 mg  0.4 mg Oral DAILY Val Wooten MD        sodium chloride (NS) flush 5-10 mL  5-10 mL IntraVENous Q8H Juan F Lamar MD   10 mL at 05/29/17 0753    sodium chloride (NS) flush 5-10 mL  5-10 mL IntraVENous PRN Juan F Lamar MD        HYDROcodone-acetaminophen [facility]) 5-325 mg per tablet 1 Tab  1 Tab Oral Q4H PRN Juan F Lamar MD        ondansetron [facility]) injection 4 mg  4 mg IntraVENous Q4H PRN Juan F Lamar MD        heparin (porcine) injection 5,000 Units  5,000 Units SubCUTAneous Q8H Juan F Lamar MD   5,000 Units at 05/29/17 0751    aspirin delayed-release tablet 81 mg  81 mg Oral DAILY Juan F Lamar MD   81 mg at 05/29/17 1000    calcitRIOL (ROCALTROL) capsule 0.25 mcg  0.25 mcg Oral DAILY Juan F Lamar MD   0.25 mcg at 05/29/17 4026    levothyroxine (SYNTHROID) tablet 125 mcg  125 mcg Oral ACB Juan F Lamar MD   125 mcg at 05/29/17 0751    insulin detemir (LEVEMIR) injection 10 Units  10 Units SubCUTAneous QHS Juan F Lamar MD   10 Units at 05/28/17 2255    budesonide (PULMICORT) 500 mcg/2 ml nebulizer suspension  500 mcg Nebulization BID RT Juan F Lamar MD   500 mcg at 05/28/17 2246    And    albuterol CONCENTRATE 2.5mg/0.5 mL neb soln  2.5 mg Nebulization Q6H RT Juan F Lamar MD   2.5 mg at 05/28/17 2246    insulin regular ([place] Chavez R, HUMULIN R) injection   SubCUTAneous AC&HS Juan F Lamar MD            No Known Allergies     Review of Systems:  Pertinent items are noted in the History of Present Illness. Objective: Intake and Output:       05/27 1901 - 05/29 0700  In: -   Out: 650 [Urine:650]    Physical Exam:   General appearance: alert, cooperative, no distress, appears stated age  Neck: supple, symmetrical, trachea midline, no adenopathy, thyroid: not enlarged, symmetric, no tenderness/mass/nodules, no carotid bruit and no JVD  Lungs: clear to auscultation bilaterally  Heart: regular rate and rhythm, S1, S2 normal, no murmur, click, rub or gallop  Abdomen: soft, obese,  non-tender.  Bowel sounds normal.   Extremities: extremities 2-3+ edema, ulcers LLE with some eryhtema          Data Review:   Recent Results (from the past 24 hour(s))   CBC WITH AUTOMATED DIFF    Collection Time: 05/28/17  3:57 PM   Result Value Ref Range    WBC 6.8 4.3 - 11.1 K/uL    RBC 2.73 (L) 4.23 - 5.67 M/uL    HGB 8.2 (L) 13.6 - 17.2 g/dL    HCT 25.9 (L) 41.1 - 50.3 %    MCV 94.9 79.6 - 97.8 FL    MCH 30.0 26.1 - 32.9 PG    MCHC 31.7 31.4 - 35.0 g/dL    RDW 13.5 11.9 - 14.6 %    PLATELET 819 141 - 134 K/uL    MPV 9.9 (L) 10.8 - 14.1 FL    DF AUTOMATED      NEUTROPHILS 80 (H) 43 - 78 %    LYMPHOCYTES 9 (L) 13 - 44 %    MONOCYTES 9 4.0 - 12.0 %    EOSINOPHILS 1 0.5 - 7.8 %    BASOPHILS 0 0.0 - 2.0 %    IMMATURE GRANULOCYTES 0.6 0.0 - 5.0 %    ABS. NEUTROPHILS 5.5 1.7 - 8.2 K/UL    ABS. LYMPHOCYTES 0.6 0.5 - 4.6 K/UL    ABS. MONOCYTES 0.6 0.1 - 1.3 K/UL    ABS. EOSINOPHILS 0.1 0.0 - 0.8 K/UL    ABS. BASOPHILS 0.0 0.0 - 0.2 K/UL    ABS. IMM. GRANS. 0.0 0.0 - 0.5 K/UL   METABOLIC PANEL, COMPREHENSIVE    Collection Time: 05/28/17  3:57 PM   Result Value Ref Range    Sodium 134 (L) 136 - 145 mmol/L    Potassium 5.3 (H) 3.5 - 5.1 mmol/L    Chloride 95 (L) 98 - 107 mmol/L    CO2 23 21 - 32 mmol/L    Anion gap 16 7 - 16 mmol/L    Glucose 205 (H) 65 - 100 mg/dL     (H) 8 - 23 MG/DL    Creatinine 8.18 (H) 0.8 - 1.5 MG/DL    GFR est AA 8 (L) >60 ml/min/1.73m2    GFR est non-AA 7 (L) >60 ml/min/1.73m2    Calcium 7.6 (L) 8.3 - 10.4 MG/DL    Bilirubin, total 0.3 0.2 - 1.1 MG/DL    ALT (SGPT) 23 12 - 65 U/L    AST (SGOT) 10 (L) 15 - 37 U/L    Alk.  phosphatase 107 50 - 136 U/L    Protein, total 7.4 6.3 - 8.2 g/dL    Albumin 2.8 (L) 3.2 - 4.6 g/dL    Globulin 4.6 (H) 2.3 - 3.5 g/dL    A-G Ratio 0.6 (L) 1.2 - 3.5     BNP    Collection Time: 05/28/17  3:57 PM   Result Value Ref Range     pg/mL   LIPASE    Collection Time: 05/28/17  3:57 PM   Result Value Ref Range    Lipase 101 73 - 393 U/L   EKG, 12 LEAD, INITIAL    Collection Time: 05/28/17  6:22 PM   Result Value Ref Range    Ventricular Rate 66 BPM    Atrial Rate 105 BPM    QRS Duration 102 ms    Q-T Interval 482 ms    QTC Calculation (Bezet) 505 ms    Calculated R Axis 28 degrees    Calculated T Axis -133 degrees    Diagnosis       !! AGE AND GENDER SPECIFIC ECG ANALYSIS !!   Atrial fibrillation  Low voltage QRS  Incomplete right bundle branch block  ST & T wave abnormality, consider inferior ischemia or digitalis effect  Prolonged QT  Abnormal ECG  When compared with ECG of 17-NOV-2014 22:11,  Atrial fibrillation has replaced Sinus rhythm  Incomplete right bundle branch block is now Present     POC LACTIC ACID    Collection Time: 05/28/17  6:30 PM   Result Value Ref Range    Lactic Acid (POC) 1.7 0.5 - 1.9 mmol/L   URINALYSIS W/ RFLX MICROSCOPIC    Collection Time: 05/28/17  6:49 PM   Result Value Ref Range    Color YELLOW      Appearance CLOUDY      Specific gravity 1.013 1.001 - 1.023      pH (UA) 5.0 5.0 - 9.0      Protein 100 (A) NEG mg/dL    Glucose 100 mg/dL    Ketone NEGATIVE  NEG mg/dL    Bilirubin NEGATIVE  NEG      Blood NEGATIVE  NEG      Urobilinogen 0.2 0.2 - 1.0 EU/dL    Nitrites NEGATIVE  NEG      Leukocyte Esterase TRACE (A) NEG      WBC 0-3 0 /hpf    Bacteria 0 0 /hpf    Amorphous Crystals TRACE 0     POC TROPONIN-I    Collection Time: 05/28/17  6:49 PM   Result Value Ref Range    Troponin-I (POC) 0.04 0.0 - 0.08 ng/ml   GLUCOSE, POC    Collection Time: 05/28/17 10:44 PM   Result Value Ref Range    Glucose (POC) 184 (H) 65 - 100 mg/dL   CBC W/O DIFF    Collection Time: 05/29/17  7:01 AM   Result Value Ref Range    WBC 5.7 4.3 - 11.1 K/uL    RBC 2.54 (L) 4.23 - 5.67 M/uL    HGB 7.6 (L) 13.6 - 17.2 g/dL    HCT 24.4 (L) 41.1 - 50.3 %    MCV 96.1 79.6 - 97.8 FL    MCH 29.9 26.1 - 32.9 PG    MCHC 31.1 (L) 31.4 - 35.0 g/dL    RDW 13.4 11.9 - 14.6 %    PLATELET 321 357 - 235 K/uL    MPV 9.4 (L) 10.8 - 14.1 FL   RENAL FUNCTION PANEL    Collection Time: 05/29/17  7:01 AM   Result Value Ref Range    Sodium 134 (L) 136 - 145 mmol/L    Potassium 5.0 3.5 - 5.1 mmol/L    Chloride 95 (L) 98 - 107 mmol/L    CO2 25 21 - 32 mmol/L    Anion gap 14 7 - 16 mmol/L    Glucose 132 (H) 65 - 100 mg/dL     (H) 8 - 23 MG/DL    Creatinine 8.25 (H) 0.8 - 1.5 MG/DL    GFR est AA 8 (L) >60 ml/min/1.73m2    GFR est non-AA 7 (L) >60 ml/min/1.73m2    Calcium 7.4 (L) 8.3 - 10.4 MG/DL    Phosphorus 8.7 (H) 2.3 - 3.7 MG/DL    Albumin 2.6 (L) 3.2 - 4.6 g/dL   GLUCOSE, POC    Collection Time: 05/29/17  7:40 AM   Result Value Ref Range    Glucose (POC) 158 (H) 65 - 100 mg/dL           Assessment:     Principal Problem:    Acute on chronic kidney failure ([facility]) (5/29/2017)    Active Problems:    Foot ulcer, left ([facility]) (5/25/2017)      PAD (peripheral artery disease) ([facility]) (5/25/2017)      Encephalopathy (5/28/2017)      CKD (chronic kidney disease) stage 5, GFR less than 15 ml/min ([facility]) (5/28/2017)      Uremia (5/28/2017)      Anemia of chronic renal failure, stage 5 ([facility]) (5/28/2017)      COPD (chronic obstructive pulmonary disease) ([facility]) (5/28/2017)      Morbid obesity ([facility] [address] (5/28/2017)      Hyperkalemia (5/28/2017)      DM (diabetes mellitus) ([facility] [address] (5/28/2017)      TONJA on CPAP (5/28/2017)      Anasarca (5/29/2017)        Plan: This is a very pleasant gentleman who was scheduled to see Dr Tana Palomares in the office this week but unfortunately landed in the hospital. He has had progressive CKD presumably secondary to DM and has been followed by a Nephrologist in [facility]. He was first told he needed permanent access 4-5 years ago. He was lost to f/u and again told about a year and a half ago. His baseline creatinine is 4.8. He does have a few variables which have been contributing to his recent decline but even absent these, he is at the point where he would benefit from dialysis initiation. Will ask Dr Aimee Reece to see him and consider PD catheter understanding the challenges.  He is insistent on home dialysis and if PD fails, Nx Stage will be considered. I discussed both modalities with him and his daughter in some detail. I think he should have a back up AV access placed as well. I will plan to initiate HD tomorrow after a permcath can be placed. [facility]. Increase diuretics for now. Will ask Urology to see as well given his ARANGO. Thank you for the kind courtesy of this consultation. Will make further adjustments to the medical regimen as the clinical course dictates and follow very closely with you.       Signed By: Amy Nguyen MD     May 29, 2017

## 2017-05-29 NOTE — PROGRESS NOTES
Dual skin assessment completed with Jade Haynes RN. Psoriasis areas to lower abdomen, under abdominal folds, breast folds, and upper thighs. No open areas noted. No redness or breakdown to sacrum or buttocks. New ST to left side under breast fold. 4+ pitting edema noted to BLE. Chen draining wili urine. Spouse and family members at bedside Oriented to room, call light placed within reach.

## 2017-05-29 NOTE — PROGRESS NOTES
Problem: Mobility Impaired (Adult and Pediatric)  Goal: *Acute Goals and Plan of Care (Insert Text)  LTG:  (1.)Mr. Deann May will move from supine to sit and sit to supine, scoot up and down and roll side to side with MODIFIED INDEPENDENCE within 7 day(s). (2.)Mr. Deann May will transfer from bed to chair and chair to bed with SUPERVISION using the least restrictive device within 7 day(s). (3.)Mr. Deann May will ambulate with STAND BY ASSIST for 150+ feet with the least restrictive device within 7 day(s). ________________________________________________________________________________________________      PHYSICAL THERAPY: INITIAL ASSESSMENT, AM 5/29/2017  INPATIENT: Hospital Day: 2  [place] / [place] / Product Type: Medicare /      NAME/AGE/GENDER: Debra Matute is a 68 y.o. male        PRIMARY DIAGNOSIS: Encephalopathy Acute on chronic kidney failure (HCC) Acute on chronic kidney failure (HCC)        ICD-10: Treatment Diagnosis:       · Generalized Muscle Weakness (M62.81)  · Difficulty in walking, Not elsewhere classified (R26.2)  · Other abnormalities of gait and mobility (R26.89)  · History of falling (Z91.81)   Precaution/Allergies:  Review of patient's allergies indicates no known allergies. ASSESSMENT:      Mr. Deann May is a 68year old male admitted from home for acute on chronic kidney disease, acute encephalopathy. He presents in supine with spouse at bedside and is agreeable to therapy assessment. Pt appears oriented and able to participate with interview. Lives at home with wife in two story home, stays on first floor. At baseline pt is ambulatory for household distances with his straight cane. Does have history of falls, last one was a few months ago. Has neuropathy with absent sensation from knees down. Wife reports she \"helps him with everything\" and is always with him providing physical assistance.  Pt performs transfer to sitting by pulling on cane that wife was holding as which they both report is baseline. LE assessment reveals decreased AROM and strength bilaterally with significant edema and ulcer on dorsal surface of left foot (sees outpatient wound clinic). Pt requires min-mod assist for sit-stand transfers and taking steps from bed to chair. He is impulsive and unsafe at times and needs max cueing for safe technique with cane. Appears very weak in standing with shuffling gait and fatigues quickly. Positioned comfortably in chair and assisted with lunch set up. Mr. Arjun Jhaveri currently appears to be functioning well below baseline with strength, mobility, transfers, and ambulation and has low activity tolerance. He will need continued therapy during acute care stay to address deficits/maximize independence with mobility. Brought up short term rehab with pt and spouse however wife seems adamantly against this idea and states \"I will hire whoever I need to help me at home\". This section established at most recent assessment   PROBLEM LIST (Impairments causing functional limitations):  1. Decreased Strength  2. Decreased ADL/Functional Activities  3. Decreased Transfer Abilities  4. Decreased Ambulation Ability/Technique  5. Decreased Balance  6. Decreased Activity Tolerance  7. Increased Fatigue  8. Increased Shortness of Breath  9. Decreased Flexibility/Joint Mobility  10. Edema/Girth  11. Decreased Skin Integrity/Hygeine  12. Decreased Cognition    INTERVENTIONS PLANNED: (Benefits and precautions of physical therapy have been discussed with the patient.)  1. Balance Exercise  2. Bed Mobility  3. Gait Training  4. Home Exercise Program (HEP)  5. Range of Motion (ROM)  6. Therapeutic Activites  7. Therapeutic Exercise/Strengthening  8.  Transfer Training      TREATMENT PLAN: Frequency/Duration: 3-4 times a week for duration of hospital stay  Rehabilitation Potential For Stated Goals: GOOD      RECOMMENDED REHABILITATION/EQUIPMENT: (at time of discharge pending progress): Continue Skilled Therapy and discharge needs TBD pending progress with therapy; [facility] PT vs rehab? ?.                   HISTORY:   History of Present Injury/Illness (Reason for Referral):  Per H&P, \"Patient is 67 y/o male w/ CKD 5 and last Cr 4.8 2 weeks ago but noted to have edema and metolazone added to his regimen. Recently seen by [facility] surgery due to non healing LE ulcers and possible vascular access for HD though he has not seen a nephrologist. Family has noted for last few days he has been lethargic, slurring speech that comes and goes. He has neuropathic pain in legs and given gabapentin though he only took a few doses. Aslo recenlty on bactrim and augmentin for leg wounds. Also noted to not urinate for 12 hours but with davenport in the ED put out 800ml in hour. No change in chronic sob. \"     Past Medical History/Comorbidities:   Mr. Jerry Wilhelm  has a past medical history of Cancer ([address] Cellulitis; Chronic kidney disease; Chronic obstructive pulmonary disease ([address] Chronic pain; Depression; Diabetes ([address] Edema; Numbness; Thyroid disease; and Vitamin D deficiency. Mr. Jerry Wilhelm  has a past surgical history that includes appendectomy; hernia repair; cholecystectomy; and prostatectomy. Social History/Living Environment:   Home Environment: Private residence  # Steps to Enter: 1  One/Two Story Residence: Two story, live on 1st floor  Lift Chair Available: No  Living Alone: No  Support Systems: Spouse/Significant Other/Partner, Child(shelia), Family member(s)  Patient Expects to be Discharged to[de-identified] Unknown  Current DME Used/Available at Home: Cane, straight, Grab bars  Tub or Shower Type: Shower  Prior Level of Function/Work/Activity:  Pt lives at home with wife in two story home with 1 step to enter. He stays on first floor. Uses cane for household ambulation and although he reports independence with navigating around house wife states she is always with him. History of dizziness and some falls.        Number of Personal Factors/Comorbidities that affect the Plan of Care: 1-2: MODERATE COMPLEXITY   EXAMINATION:   Most Recent Physical Functioning:   Gross Assessment:  AROM: Generally decreased, functional  Strength: Generally decreased, functional  Coordination: Generally decreased, functional  Sensation: Impaired (absent below knees d/t neuropathy)               Posture:  Posture (WDL): Exceptions to WDL  Posture Assessment: Forward head, Rounded shoulders, Trunk flexion  Balance:  Sitting: Impaired  Sitting - Static: Fair (occasional)  Sitting - Dynamic: Fair (occasional)  Standing: Impaired  Standing - Static: Fair  Standing - Dynamic : Poor Bed Mobility:  Supine to Sit: Moderate assistance  Scooting: Minimum assistance  Wheelchair Mobility:     Transfers:  Sit to Stand: Minimum assistance  Stand to Sit: Minimum assistance  Bed to Chair: Minimum assistance; Moderate assistance  Gait:     Base of Support: Widened;Center of gravity altered  Speed/Evita: Slow;Delayed  Step Length: Left shortened;Right shortened  Gait Abnormalities: Trunk sway increased;Decreased step clearance;Shuffling gait  Distance (ft): 3 Feet (ft)  Assistive Device: Cane, straight (+ handheld assist)  Ambulation - Level of Assistance: Minimal assistance; Moderate assistance  Interventions: Verbal cues; Visual/Demos; Safety awareness training; Tactile cues;Manual cues       Body Structures Involved:  1. Nerves  2. Metabolic  3. Muscles Body Functions Affected:  1. Neuromusculoskeletal  2. Movement Related  3. Skin Related  4. Metobolic/Endocrine Activities and Participation Affected:  1. General Tasks and Demands  2. Mobility  3. Self Care  4. Domestic Life  5.  Community, Social and [place]   Number of elements that affect the Plan of Care: 4+: HIGH COMPLEXITY   CLINICAL PRESENTATION:   Presentation: Evolving clinical presentation with changing clinical characteristics: MODERATE COMPLEXITY   CLINICAL DECISION MAKIN [facility] [id number] AM-PAC 6 Clicks   Basic Mobility Inpatient Short Form  How much difficulty does the patient currently have. .. Unable A Lot A Little None   1. Turning over in bed (including adjusting bedclothes, sheets and blankets)? [ ] 1   [ ] 2   [X] 3   [ ] 4   2. Sitting down on and standing up from a chair with arms ( e.g., wheelchair, bedside commode, etc.)   [ ] 1   [X] 2   [ ] 3   [ ] 4   3. Moving from lying on back to sitting on the side of the bed? [ ] 1   [X] 2   [ ] 3   [ ] 4   How much help from another person does the patient currently need. .. Total A Lot A Little None   4. Moving to and from a bed to a chair (including a wheelchair)? [ ] 1   [X] 2   [ ] 3   [ ] 4   5. Need to walk in hospital room? [ ] 1   [X] 2   [ ] 3   [ ] 4   6. Climbing 3-5 steps with a railing? [X] 1   [ ] 2   [ ] 3   [ ] 4   © 2007, Trustees of [address] [place] [address], under license to "[employer]". All rights reserved    Score:  Initial: 12 Most Recent: X (Date: -- )     Interpretation of Tool:  Represents activities that are increasingly more difficult (i.e. Bed mobility, Transfers, Gait). Score 24 23 22-20 19-15 14-10 9-7 6       Modifier CH CI CJ CK CL CM CN         · Mobility - Walking and Moving Around:               - CURRENT STATUS:    CL - 60%-79% impaired, limited or restricted               - GOAL STATUS:           CK - 40%-59% impaired, limited or restricted               - D/C STATUS:                       ---------------To be determined---------------  [place] / [place] / Product Type: Medicare /       Medical Necessity:     · Patient demonstrates good rehab potential due to higher previous functional level. Reason for Services/Other Comments:  · Patient continues to demonstrate capacity to improve strength, mobility, balance, transfers, activity tolerance which will increase independence, decrease amount of assistance required from caregiver and increase safety.    Use of outcome tool(s) and clinical judgement create a POC that gives a: Questionable prediction of patient's progress: MODERATE COMPLEXITY                 TREATMENT:   (In addition to Assessment/Re-Assessment sessions the following treatments were rendered)   Pre-treatment Symptoms/Complaints:  \"I will try\"  Pain: Initial:   Pain Intensity 1: 0  Post Session:  0/10      Assessment/Reassessment only, no treatment provided today     Braces/Orthotics/Lines/Etc:   · O2 Device: Room air  Treatment/Session Assessment:    · Response to Treatment:  Pt requires min-mod assist with mobility and taking steps to chair  · Interdisciplinary Collaboration:  · Physical Therapist  · Registered Nurse  · After treatment position/precautions:  · Up in chair  · Bed/Chair-wheels locked  · Bed in low position  · Call light within reach  · Family at bedside  · Compliance with Program/Exercises: Will assess as treatment progresses. · Recommendations/Intent for next treatment session: \"Next visit will focus on advancements to more challenging activities and reduction in assistance provided\".   Total Treatment Duration:  PT Patient Time In/Time Out  Time In: 1130  Time Out: [address] [facility] ULYSSES [place]

## 2017-05-29 NOTE — PROGRESS NOTES
Spoke to Mr. Sanaz Turner and his wife in room 608 about discharge planning, including outpatient HD. He uses a wheelchair, and his wife says that she would be able to transport him. They prefer MWF HD slot, and prefer afternoons. They are agreeable to [address] ([address]). Faxed clinicals to [facility] [facility] fax [phone] (phone [phone]). The Hep B surface antigen and ppd were collected/placed today. Results must be faxed, when available, to receive final approval from [facility] [facility] for outpatient HD. Mr. Sanaz Turner also wanted to discuss PD. However, suggested that he speak to his renal physician and see if that is an option down the road. He is in agreement.

## 2017-05-29 NOTE — WOUND CARE
Bilateral dorsal surface of feet below 3rd and 4th toes with dry eschar,  Left is 3x2.5cm right is 2 areas 1.5x1cm each, skin is peeling surrounding these areas. Patient has had recent blood flow studies and was referred to vascular surgery outpatient possible revascularization procedures after kidney failure is treated or dialysis is established, then begin to treat wounds. Recommend betadine paint bid until blood flow is improved. Will monitor.

## 2017-05-29 NOTE — H&P
HOSPITALIST H&P/CONSULT  NAME:  Rebel Montoya   Age:  68 y.o.  :   1940   MRN:   [id number]  PCP: Ruth Velazquez MD  Consulting MD:  Treatment Team: Attending Provider: Yovani Jason MD  HPI:   Patient is 69 y/o male w/ CKD 5 and last Cr 4.8 2 weeks ago but noted to have edema and metolazone added to his regimen. Recently seen by [facility] surgery due to non healing LE ulcers and possible vascular access for HD though he has not seen a nephrologist.   Family has noted for last few days he has been lethargic, slurring speech that comes and goes. He has neuropathic pain in legs and given gabapentin though he only took a few doses. Aslo recenlty on bactrim and augmentin for leg wounds. Also noted to not urinate for 12 hours but with [place] in the ED put out 800ml in hour. No change in chronic sob. Complete ROS done and is as stated in HPI or otherwise  negative  Past Medical History:   Diagnosis Date    Cancer [facility])     prostate    Cellulitis     Chronic kidney disease     Chronic obstructive pulmonary disease (HCC)     Chronic pain     Depression     Diabetes ([facility]     Edema     Numbness     knee's down    Thyroid disease     Vitamin D deficiency       Past Surgical History:   Procedure Laterality Date    HX APPENDECTOMY      HX CHOLECYSTECTOMY      HX HERNIA REPAIR      HX PROSTATECTOMY        Prior to Admission Medications   Prescriptions Last Dose Informant Patient Reported? Taking? Biotin 2,500 mcg cap   Yes No   Sig: Take  by mouth. acetaminophen-codeine (TYLENOL-CODEINE #3) 300-30 mg per tablet   Yes No   Sig: Take 1 Tab by mouth every four (4) hours as needed for Pain.   amoxicillin-clavulanate (AUGMENTIN) 875-125 mg per tablet   No No   Sig: Take 1 Tab by mouth every twelve (12) hours. Indications: DIABETIC FOOT INFECTION   aspirin delayed-release 81 mg tablet   Yes No   Sig: Take  by mouth daily.    budesonide-formoterol (SYMBICORT) 160-4.5 mcg/actuation HFA inhaler   Yes No   Sig: Take 2 Puffs by inhalation two (2) times a day. bumetanide (BUMEX) 2 mg tablet   Yes No   Sig: Take 2 mg by mouth two (2) times a day. calcitRIOL (ROCALTROL) 0.25 mcg capsule   Yes No   Sig: Take 0.25 mcg by mouth daily. gabapentin (NEURONTIN) 300 mg capsule   No No   Sig: Take 1 Cap by mouth three (3) times daily. Indications: NEUROPATHIC PAIN   insulin detemir (LEVEMIR FLEXPEN) 100 unit/mL (3 mL) inpn   Yes No   Sig: by SubCUTAneous route. levothyroxine (LEVOXYL) 125 mcg tablet   Yes No   Sig: Take  by mouth Daily (before breakfast). metOLazone (ZAROXOLYN) 5 mg tablet   Yes No   Sig: Take  by mouth daily. Facility-Administered Medications: None     No Known Allergies   Social History   Substance Use Topics    Smoking status: Former Smoker    Smokeless tobacco: Never Used    Alcohol use Not on file      Family History   Problem Relation Age of Onset    No Known Problems Mother     Stroke Father       Objective:     Visit Vitals    /78 (BP 1 Location: Left arm, BP Patient Position: At rest)    Pulse 64    Temp 97.4 °F (36.3 °C)    Resp 18    Ht 5' 11\" (1.803 m)    Wt (!) 167.8 kg (370 lb)    SpO2 95%    BMI 51.6 kg/m2      Temp (24hrs), Av.4 °F (36.3 °C), Min:97.4 °F (36.3 °C), Max:97.4 °F (36.3 °C)    Oxygen Therapy  O2 Sat (%): 95 % (17)  Pulse via Oximetry: 63 beats per minute (17 1933)  O2 Device: Room air (17 1553)  Physical Exam:  General:    Alert, obese elderly male  Head:   Normocephalic, without obvious abnormality, atraumatic. Nose:  Nares normal. No drainage or sinus tenderness. Lungs:   Decreased air movement  Heart:   Regular rate and rhythm,  no murmur, rub or gallop. Generalized edema 2+  Abdomen:   Soft, non-tender. Not distended.   Bowel sounds normal.   Extremities: Intact passive rom   Skin:     Chronic b/l LE redness, ulcers of b/l legs  Neurologic: Alert and oriented x 3, no focal deficits   Data Review:   Recent Results (from the past 24 hour(s))   CBC WITH AUTOMATED DIFF    Collection Time: 05/28/17  3:57 PM   Result Value Ref Range    WBC 6.8 4.3 - 11.1 K/uL    RBC 2.73 (L) 4.23 - 5.67 M/uL    HGB 8.2 (L) 13.6 - 17.2 g/dL    HCT 25.9 (L) 41.1 - 50.3 %    MCV 94.9 79.6 - 97.8 FL    MCH 30.0 26.1 - 32.9 PG    MCHC 31.7 31.4 - 35.0 g/dL    RDW 13.5 11.9 - 14.6 %    PLATELET 837 676 - 539 K/uL    MPV 9.9 (L) 10.8 - 14.1 FL    DF AUTOMATED      NEUTROPHILS 80 (H) 43 - 78 %    LYMPHOCYTES 9 (L) 13 - 44 %    MONOCYTES 9 4.0 - 12.0 %    EOSINOPHILS 1 0.5 - 7.8 %    BASOPHILS 0 0.0 - 2.0 %    IMMATURE GRANULOCYTES 0.6 0.0 - 5.0 %    ABS. NEUTROPHILS 5.5 1.7 - 8.2 K/UL    ABS. LYMPHOCYTES 0.6 0.5 - 4.6 K/UL    ABS. MONOCYTES 0.6 0.1 - 1.3 K/UL    ABS. EOSINOPHILS 0.1 0.0 - 0.8 K/UL    ABS. BASOPHILS 0.0 0.0 - 0.2 K/UL    ABS. IMM. GRANS. 0.0 0.0 - 0.5 K/UL   METABOLIC PANEL, COMPREHENSIVE    Collection Time: 05/28/17  3:57 PM   Result Value Ref Range    Sodium 134 (L) 136 - 145 mmol/L    Potassium 5.3 (H) 3.5 - 5.1 mmol/L    Chloride 95 (L) 98 - 107 mmol/L    CO2 23 21 - 32 mmol/L    Anion gap 16 7 - 16 mmol/L    Glucose 205 (H) 65 - 100 mg/dL     (H) 8 - 23 MG/DL    Creatinine 8.18 (H) 0.8 - 1.5 MG/DL    GFR est AA 8 (L) >60 ml/min/1.73m2    GFR est non-AA 7 (L) >60 ml/min/1.73m2    Calcium 7.6 (L) 8.3 - 10.4 MG/DL    Bilirubin, total 0.3 0.2 - 1.1 MG/DL    ALT (SGPT) 23 12 - 65 U/L    AST (SGOT) 10 (L) 15 - 37 U/L    Alk.  phosphatase 107 50 - 136 U/L    Protein, total 7.4 6.3 - 8.2 g/dL    Albumin 2.8 (L) 3.2 - 4.6 g/dL    Globulin 4.6 (H) 2.3 - 3.5 g/dL    A-G Ratio 0.6 (L) 1.2 - 3.5     BNP    Collection Time: 05/28/17  3:57 PM   Result Value Ref Range     pg/mL   LIPASE    Collection Time: 05/28/17  3:57 PM   Result Value Ref Range    Lipase 101 73 - 393 U/L   POC LACTIC ACID    Collection Time: 05/28/17  6:30 PM   Result Value Ref Range    Lactic Acid (POC) 1.7 0.5 - 1.9 mmol/L   URINALYSIS W/ RFLX MICROSCOPIC Collection Time: 05/28/17  6:49 PM   Result Value Ref Range    Color YELLOW      Appearance CLOUDY      Specific gravity 1.013 1.001 - 1.023      pH (UA) 5.0 5.0 - 9.0      Protein 100 (A) NEG mg/dL    Glucose 100 mg/dL    Ketone NEGATIVE  NEG mg/dL    Bilirubin NEGATIVE  NEG      Blood NEGATIVE  NEG      Urobilinogen 0.2 0.2 - 1.0 EU/dL    Nitrites NEGATIVE  NEG      Leukocyte Esterase TRACE (A) NEG      WBC 0-3 0 /hpf    Bacteria 0 0 /hpf    Amorphous Crystals TRACE 0     POC TROPONIN-I    Collection Time: 05/28/17  6:49 PM   Result Value Ref Range    Troponin-I (POC) 0.04 0.0 - 0.08 ng/ml     Imaging /Procedures /Studies     Assessment and Plan:     Patient Active Problem List   Diagnosis Code    [facility] ulcer[facility] left ([facility] L97.529    PAD (peripheral artery disease) ([facility]) I73.9    Encephalopathy G93.40    CKD (chronic kidney disease) stage 5, GFR less than 15 ml/min ([facility]) N18.5    Uremia N19    Anemia of chronic renal failure, stage 5 ([facility]) N18.5, D63.1    COPD (chronic obstructive pulmonary disease) ([facility]) J44.9    Morbid obesity ([facility]) E66.01    Hyperkalemia E87.5    DM (diabetes mellitus) ([facility]) E11.9    TONJA on CPAP G47.33, Z99.89    Anasarca R60.1    Acute on chronic kidney failure ([facility]) N17.9, N18.9       PLAN  · Acute on ckd 5- not really any change in GFR, though may need to start HD this admission, neprhology consult  · Uremia- may be cause of his encephalopathy   · riss  · cpap  · Wound care consult  Code Status: full        Signed By: Linn Ngo MD     May 28, 2017

## 2017-05-30 NOTE — PROGRESS NOTES
[facility]    Follow-Up on: new ESRD    HPI:PT seen on first dialysis treatment. Feels weak and tired. ROS:  Denies CP, SOB.     Current Facility-Administered Medications   Medication Dose Route Frequency    midazolam (VERSED) injection 5 mg  5 mg IntraMUSCular ONCE    metOLazone (ZAROXOLYN) tablet 5 mg  5 mg Oral BID    tamsulosin (FLOMAX) capsule 0.4 mg  0.4 mg Oral DAILY    epoetin fam (EPOGEN;PROCRIT) injection 10,000 Units  10,000 Units SubCUTAneous Q7D    povidone-iodine (BETADINE) 10 % topical solution   Topical BID    bumetanide (BUMEX) tablet 2 mg  2 mg Oral BID    sodium chloride (NS) flush 5-10 mL  5-10 mL IntraVENous Q8H    sodium chloride (NS) flush 5-10 mL  5-10 mL IntraVENous PRN    HYDROcodone-acetaminophen (NORCO) 5-325 mg per tablet 1 Tab  1 Tab Oral Q4H PRN    ondansetron (ZOFRAN) injection 4 mg  4 mg IntraVENous Q4H PRN    heparin (porcine) injection 5,000 Units  5,000 Units SubCUTAneous Q8H    aspirin delayed-release tablet 81 mg  81 mg Oral DAILY    calcitRIOL (ROCALTROL) capsule 0.25 mcg  0.25 mcg Oral DAILY    levothyroxine (SYNTHROID) tablet 125 mcg  125 mcg Oral ACB    insulin detemir (LEVEMIR) injection 10 Units  10 Units SubCUTAneous QHS    budesonide (PULMICORT) 500 mcg/2 ml nebulizer suspension  500 mcg Nebulization BID RT    And    albuterol CONCENTRATE 2.5mg/0.5 mL neb soln  2.5 mg Nebulization Q6H RT    insulin regular (NOVOLIN R, HUMULIN R) injection   SubCUTAneous AC&HS       Exam:  Vitals:    05/30/17 1024 05/30/17 1101 05/30/17 1131 05/30/17 1201   BP: 106/85 143/60 136/54 (!) 117/27   Pulse: 63 62 67 64   Resp: 19      Temp:       SpO2: 95%      Weight:       Height:             Intake/Output Summary (Last 24 hours) at 05/30/17 1203  Last data filed at 05/29/17 1946   Gross per 24 hour   Intake              240 ml   Output              360 ml   Net             -120 ml     PE:  GEN - in no distress, morbidly obese  CV - regular, no murmur, no rub  Lung - clear bilaterally  Abd - soft, nontender  Ext - 2-3+ edema    Labs  Recent Labs      05/29/17   0701  05/28/17   1557   WBC  5.7  6.8   HGB  7.6*  8.2*   HCT  24.4*  25.9*   PLT  224  266     Recent Labs      05/29/17   0701  05/28/17   1557   NA  134*  134*   K  5.0  5.3*   CL  95*  95*   CO2  25  23   BUN  139*  135*   CREA  8.25*  8.18*   GLU  132*  205*   CA  7.4*  7.6*   PHOS  8.7*   --      No results for input(s): PH, PCO2, PO2, PCO2 in the last 72 hours. Problem List:  Patient Active Problem List    Diagnosis Date Noted    Anasarca 05/29/2017    Acute on chronic kidney failure ([facility] 05/29/2017    Encephalopathy 05/28/2017    CKD (chronic kidney disease) stage 5, GFR less than 15 ml/min ([facility] 05/28/2017    Uremia 05/28/2017    Anemia of chronic renal failure, stage 5 ([address] 05/28/2017    COPD (chronic obstructive pulmonary disease) ([facility] 05/28/2017    Morbid obesity ([facility] 05/28/2017    Hyperkalemia 05/28/2017    DM (diabetes mellitus) ([facility] 05/28/2017    TONJA on CPAP 05/28/2017    Foot ulcer, left ([address] 05/25/2017    PAD (peripheral artery disease) ([facility] 05/25/2017       Issues Addressed By Nephrology:  1. ESRD: New. Wants home dialysis. Appreciate Dr. Elli Flores evaluation. PD possibilities limited by abdominal surgeries and his obesity. HD #1 today. 2. Anemia with ESRD  3.  Hyperphosphatemia    Plan:  -Add phoslo  -Dialysis tomorrow

## 2017-05-30 NOTE — DIALYSIS
TRANSFER - IN REPORT:    Verbal report received from [place], RN(name) on Rosalie Vasquez  being received from IR(unit) for routine progression of care      Report consisted of patients Situation, Background, Assessment and   Recommendations(SBAR). Information from the following report(s) Procedure Summary was reviewed with the receiving nurse. Lines  Medication    Opportunity for questions and clarification was provided. Assessment completed upon patients arrival to unit and care assumed.

## 2017-05-30 NOTE — PROGRESS NOTES
PT note: Attempted treatment again this afternoon but pt declined working with PT today. He stated he hasn't had anything to eat and he is like a rubber band and would just be jello on the floor. Will attempt again tomorrow or as schedule allows.     Thanks,   Romeo Morillo, FLORT

## 2017-05-30 NOTE — PROGRESS NOTES
Hospitalist Progress Note    2017  Admit Date: 2017  3:57 PM   NAME: Rebel Montoya   :  1940   MRN:  [id number]   Attending: Yovani Jason MD  PCP:  Ruth Velazquez MD    SUBJECTIVE:   Mr. Rebel Montoya is a 67 yo M admitted 17 for worsening renal failure/uremia, volume overload, and confusion. He was seen with his wife and daughter at bedside. He reports his legs and feet hurt. Discussed that gabapentin may be able to be added back at a lower dose when dialysis started. Seen by nephrology and plan for perm cath tomorrow and starting HD. Aside from leg and foot pain, Mr. Duc Elder denies concerns. 17- seen without family at bedside. States he still feels poorly. Had permcath insertion today and first session of hemodialysis. Denies new concerns. Review of Systems negative with exception of pertinent positives noted above  PHYSICAL EXAM     Visit Vitals    /52 (BP 1 Location: Left arm, BP Patient Position: At rest)    Pulse 76    Temp 98.3 °F (36.8 °C)    Resp 18    Ht 5' 11\" (1.803 m)    Wt (!) 167.8 kg (370 lb)    SpO2 91%    BMI 51.6 kg/m2      Temp (24hrs), Av.8 °F (36 °C), Min:92.3 °F (33.5 °C), Max:98.4 °F (36.9 °C)    Oxygen Therapy  O2 Sat (%): 91 % (17 0735)  Pulse via Oximetry: 84 beats per minute (17 0735)  O2 Device: Room air (17 0803)  O2 Flow Rate (L/min): 2 l/min (17 0213)    Intake/Output Summary (Last 24 hours) at 17 0855  Last data filed at 17   Gross per 24 hour   Intake              240 ml   Output              360 ml   Net             -120 ml      General: No acute distress, morbidly obese  Lungs:  CTA Bilaterally.    Heart:  Regular rate and rhythm,  No murmur, rub, or gallop  Abdomen: Soft, Non distended, Non tender, Positive bowel sounds  Extremities: 2-+ leg edema, ulcers noted on dorsal aspect of feet   Neurologic:  No focal deficits    Recent Results (from the past 24 hour(s))   GLUCOSE, POC    Collection Time: 05/29/17 10:51 AM   Result Value Ref Range    Glucose (POC) 159 (H) 65 - 100 mg/dL   GLUCOSE, POC    Collection Time: 05/29/17  3:51 PM   Result Value Ref Range    Glucose (POC) 266 (H) 65 - 100 mg/dL   GLUCOSE, POC    Collection Time: 05/29/17  8:05 PM   Result Value Ref Range    Glucose (POC) 317 (H) 65 - 100 mg/dL   GLUCOSE, POC    Collection Time: 05/30/17  7:33 AM   Result Value Ref Range    Glucose (POC) 130 (H) 65 - 100 mg/dL     US RETROPERITONEUM COMP   Final Result   Impression:  Abdominal aortic ectasia, right renal cyst         XR FOOT LT MIN 3 V   Final Result   IMPRESSION: No periostitis or erosions. Extensive vascular calcifications. XR CHEST PORT   Final Result   IMPRESSION:  Cardiomegaly suspected. Mild interstitial prominence which could be   acute or chronic. Minimal band of atelectasis left midlung zone. CT UROGRAM WO CONT   Final Result   IMPRESSION:  Negative for radiopaque urinary tract calculi or evidence of   obstruction. Other findings as above. CT HEAD WO CONT   Final Result   IMPRESSION:  Negative for acute intracranial abnormality. Chronic changes.             IR INSERT TUNL CVC W/O PORT OVER 5 YR    (Results Pending)       [address] Problems    Diagnosis Date Noted    Anasarca 05/29/2017    Acute on chronic kidney failure ([address] 05/29/2017    Encephalopathy 05/28/2017    CKD (chronic kidney disease) stage 5, GFR less than 15 ml/min ([facility]) 05/28/2017    Uremia 05/28/2017    Anemia of chronic renal failure, stage 5 ([address] 05/28/2017    COPD (chronic obstructive pulmonary disease) ([address] 05/28/2017    Morbid obesity ([address] 05/28/2017    Hyperkalemia 05/28/2017    DM (diabetes mellitus) ([address] 05/28/2017    TONJA on CPAP 05/28/2017    Foot ulcer, left ([address] 05/25/2017    PAD (peripheral artery disease) ([address] 05/25/2017     Plan:  · ESRD- s/p permcath insertion and hemodialysis session 1.  · Encephalopathy/AMS- improved. Likley related to worsening renal failure and recent start of gabapentin. · Leg pain/neuropathy- prn norco for now. May add back lower dose of gabapentin soon. · DM- blood sugars decent. Continue current regimen. DVT Prophylaxis: Heparin    Signed By: Misti Lvoell.  Zelda Ayala MD     May 30, 2017

## 2017-05-30 NOTE — PROGRESS NOTES
Called down to radiology and spoke to [facility], asked that she would have someone come up to assess if there is a hematoma in the location of where his temp tunnel cath was placed, which was suspected by Vish Ahuja and Neela Alvarez RN.

## 2017-05-30 NOTE — DIALYSIS
Consent verified for RRT. First hemodialysis treatment initiated using right perm catheter by Cristal Carroll RN. Aspirated and flushed both ports without problem. Dressing clean, dry and intact. Pt alert and VS stable. Machine settings per MD order. Will monitor during treatment.

## 2017-05-30 NOTE — PROCEDURES
Department of Interventional Radiology  [phone]        Interventional Radiology Brief Procedure Note    Patient: Rbeel Montoya MRN: [id number]  SSN: [id number]    YOB: 1940  Age: 68 y.o.   Sex: male      Date of Procedure: 5/30/2017    Pre-Procedure Diagnosis: ESRD    Post-Procedure Diagnosis: SAME    Procedure(s): Tunneled Central Venous Catheter    Brief Description of Procedure: US, fluoro guided right IJ tunneled hemodialysis catheter placed    Performed By: Richard Lawrence PA-C     Assistants: None    Anesthesia:Lidocaine    Estimated Blood Loss: Less than 10ml    Specimens: None    Implants: Tunneled Hemodialysis Catheter    Findings: catheter tip in right atrium    Complications: None    Recommendations: ok to use catheter     Follow Up: nephrology    Signed By: Richard Lawrence PA-C     May 30, 2017

## 2017-05-30 NOTE — PROGRESS NOTES
TRANSFER - OUT REPORT:    Verbal report given to Talya RN(name) on [facility]  being transferred to dialysis(unit) for routine progression of care       Report consisted of patients Situation, Background, Assessment and   Recommendations(SBAR). Information from the following report(s) Procedure Summary and MAR was reviewed with the receiving nurse. Lines:       Opportunity for questions and clarification was provided.       Patient transported with:   [employer]

## 2017-05-30 NOTE — PROGRESS NOTES
Dispo update:  Received call from [address] [facility] (corpor[facility]). Updated them that Mr. Myrna Hanks received his first HD today via perm cath. However, ppd and Hep B surface AG pending. Will need to fax ppd and Hep B surface AG results when available.

## 2017-05-30 NOTE — PROGRESS NOTES
PT note: Attempted treatment this morning however pt is currently off floor. Will check back this afternoon or as schedule allows.     Thanks,  FLOR TrinidadT

## 2017-05-30 NOTE — CONSULTS
[address]   [address] [place] [address]. . [address] FAX: [phone]    Vascular Surgery Consult    Subjective:      Henrietta Cristina is a 68 y.o. male who we are asked to see regarding peritoneal dialysis catheter placement. He was seen last week in the office and evaluated by Dr. Marybeth Persaud for a non-healing foot wound to his left foot. His creatinine on 5/29/17 was 8.25. When he saw Dr. Marybeth Persaud in the office his creatinine was 4.9. Vein mapping was performed in the office last week for long-term HD placement. He was admitted on 5/28/17 for increasing lethargy and noted an increase in his creatinine.      Past Medical History:   Diagnosis Date    Cancer [facility])     prostate    Cellulitis     Chronic kidney disease     Chronic obstructive pulmonary disease (HCC)     Chronic pain     Depression     Diabetes ([facility]     Edema     Numbness     knee's down    Thyroid disease     Vitamin D deficiency      Past Surgical History:   Procedure Laterality Date    HX APPENDECTOMY      HX CHOLECYSTECTOMY      HX HERNIA REPAIR      HX PROSTATECTOMY        Family History   Problem Relation Age of Onset    No Known Problems Mother     Stroke Father      Social History     Social History    Marital status:      Spouse name: N/A    Number of children: N/A    Years of education: N/A     Social History Main Topics    Smoking status: Former Smoker    Smokeless tobacco: Never Used    Alcohol use None    Drug use: None    Sexual activity: Not Asked     Other Topics Concern    None     Social History Narrative      Current Facility-Administered Medications   Medication Dose Route Frequency    lidocaine (XYLOCAINE) 20 mg/mL (2 %) injection  mg  1-20 mL IntraDERMal ONCE    sodium bicarbonate (NEUT) injection 1-2 mL  1-2 mL SubCUTAneous ONCE    heparin (PF) 2 units/ml in NS infusion 2,000 Units  1,000 mL Irrigation ONCE    heparin (porcine) 1,000 unit/mL injection 2,000-6,000 Units 2,000-6,000 Units IntraVENous ONCE    lidocaine-EPINEPHrine (XYLOCAINE) 2 %-1:100,000 injection  mg  2-20 mL IntraDERMal ONCE    midazolam (VERSED) injection 5 mg  5 mg IntraMUSCular ONCE    metOLazone (ZAROXOLYN) tablet 5 mg  5 mg Oral BID    tamsulosin (FLOMAX) capsule 0.4 mg  0.4 mg Oral DAILY    epoetin fam (EPOGEN;PROCRIT) injection 10,000 Units  10,000 Units SubCUTAneous Q7D    povidone-iodine (BETADINE) 10 % topical solution   Topical BID    bumetanide (BUMEX) tablet 2 mg  2 mg Oral BID    sodium chloride (NS) flush 5-10 mL  5-10 mL IntraVENous Q8H    sodium chloride (NS) flush 5-10 mL  5-10 mL IntraVENous PRN    HYDROcodone-acetaminophen (NORCO) 5-325 mg per tablet 1 Tab  1 Tab Oral Q4H PRN    ondansetron (ZOFRAN) injection 4 mg  4 mg IntraVENous Q4H PRN    heparin (porcine) injection 5,000 Units  5,000 Units SubCUTAneous Q8H    aspirin delayed-release tablet 81 mg  81 mg Oral DAILY    calcitRIOL (ROCALTROL) capsule 0.25 mcg  0.25 mcg Oral DAILY    levothyroxine (SYNTHROID) tablet 125 mcg  125 mcg Oral ACB    insulin detemir (LEVEMIR) injection 10 Units  10 Units SubCUTAneous QHS    budesonide (PULMICORT) 500 mcg/2 ml nebulizer suspension  500 mcg Nebulization BID RT    And    albuterol CONCENTRATE 2.5mg/0.5 mL neb soln  2.5 mg Nebulization Q6H RT    insulin regular (NOVOLIN R, HUMULIN R) injection   SubCUTAneous AC&HS      No Known Allergies    Review of Systems:  A comprehensive review of systems was negative except for that written in the History of Present Illness.     Objective:     Patient Vitals for the past 8 hrs:   BP Temp Pulse Resp SpO2   17 0900 112/56 98.4 °F (36.9 °C) 62 16 (!) 86 %   17 0735 - - - - 91 %   17 0715 127/52 98.3 °F (36.8 °C) 76 18 91 %   17 0510 129/46 - (!) 56 17 93 %   17 0214 - 98.4 °F (36.9 °C) - - -     Temp (24hrs), Av.1 °F (36.2 °C), Min:92.3 °F (33.5 °C), Max:98.4 °F (36.9 °C)      Physical Exam:  GENERAL: alert, cooperative, no distress, appears stated age, LUNG: clear to auscultation bilaterally, HEART: regular rate and rhythm, S1, S2 normal, no murmur, click, rub or gallop, ABDOMEN: soft, non-tender. Bowel sounds normal. No masses,  no organomegaly    Assessment/Plan:    -IR today for TCC for HD  -Will discuss with Dr. Alok Austin for possible PD catheter placement. Patient has had previous abdominal surgeries in the past, so I'm unsure if he will be a candidate for PD. Thank you for allowing us to participate in the care of Mr. Jake Malik. We will continue to follow for now. Signed By: Anjum Kenny NP     May 30, 2017       Elements of this note have been dictated using speech recognition software. As a result, errors of speech recognition may have occurred.

## 2017-05-30 NOTE — PROGRESS NOTES
TRANSFER - OUT REPORT:    Verbal report given to [facility] RN(name) on [facility]  being transferred to 60(unit) for ordered procedure       Report consisted of patients Situation, Background, Assessment and   Recommendations(SBAR). Information from the following report(s) Procedure Summary and MAR was reviewed with the receiving nurse. Lines:       Opportunity for questions and clarification was provided.       Patient transported with:   [employer]

## 2017-05-31 PROBLEM — F32.1 MODERATE SINGLE CURRENT EPISODE OF MAJOR DEPRESSIVE DISORDER (HCC): Status: ACTIVE | Noted: 2017-01-01

## 2017-05-31 NOTE — DIALYSIS
HD treatment initiated via right perm cath without difficulty. CVC dressing clean, dry, and intact, tego caps intact, bilateral lumen aspirated and flushed with 9cc NS. VS stable, will continue to monitor during tx. No Heparin this tx. Machine tests passed and alarms intact. NAD.

## 2017-05-31 NOTE — PROGRESS NOTES
Speech Pathology Note:    Screen received via Best Practice Alert from Nursing Assessment. Screen completed and Chart reviewed. Nursing Functional assessment revealed abnormal vocal quality/secretions. Patient currently on regular renal diet with no notation of dysphagia. No speech therapy services indicated per chart screen. Thank you,  Isabelle Donis MS, CCC-SLP

## 2017-05-31 NOTE — PROGRESS NOTES
Problem: Mobility Impaired (Adult and Pediatric)  Goal: *Acute Goals and Plan of Care (Insert Text)  LTG:  (1.)Mr. Arsenio Al will move from supine to sit and sit to supine, scoot up and down and roll side to side with MODIFIED INDEPENDENCE within 7 day(s). (2.)Mr. Arsenio Al will transfer from bed to chair and chair to bed with SUPERVISION using the least restrictive device within 7 day(s). (3.)Mr. Arsenio Al will ambulate with STAND BY ASSIST for 150+ feet with the least restrictive device within 7 day(s). ________________________________________________________________________________________________      PHYSICAL THERAPY: Daily Note, Treatment Day: 1st and AM 5/31/2017  INPATIENT: Hospital Day: 4  [place] / Product Type: Medicare /      NAME/AGE/GENDER: Hailey Simmons is a 68 y.o. male        PRIMARY DIAGNOSIS: Encephalopathy Acute on chronic kidney failure (HCC) Acute on chronic kidney failure ([facility] [address]        ICD-10: Treatment Diagnosis:       · Generalized Muscle Weakness (M62.81)  · Difficulty in walking, Not elsewhere classified (R26.2)  · Other abnormalities of gait and mobility (R26.89)  · History of falling (Z91.81)   Precaution/Allergies:  Review of patient's allergies indicates no known allergies. ASSESSMENT:      Mr. Arsenio Al is a 68year old male admitted from home for acute on chronic kidney disease, acute encephalopathy. Pt is sitting up in bedside chair upon contact and agreeable to PT treatment this morning. Pt is min-modA with sit to stand from chair. Pt ambulated 5 ft from chair to bed with Nasim requiring HHA from PT and SPC. Pt states he could not walk any further. Pt demonstrates good sitting balance. Pt required Nasim to B LE to lift them into bed and VC for technique. Pt performed exercises while supine in bed requiring VC for technique. Pt left supine in bed with all needs met and within reach waiting for transport down to dialysis.  Pt tolerated exercises well and is making slow progress towards goals. This section established at most recent assessment   PROBLEM LIST (Impairments causing functional limitations):  1. Decreased Strength  2. Decreased ADL/Functional Activities  3. Decreased Transfer Abilities  4. Decreased Ambulation Ability/Technique  5. Decreased Balance  6. Decreased Activity Tolerance  7. Increased Fatigue  8. Increased Shortness of Breath  9. Decreased Flexibility/Joint Mobility  10. Edema/Girth  11. Decreased Skin Integrity/Hygeine  12. Decreased Cognition    INTERVENTIONS PLANNED: (Benefits and precautions of physical therapy have been discussed with the patient.)  1. Balance Exercise  2. Bed Mobility  3. Gait Training  4. Home Exercise Program (HEP)  5. Range of Motion (ROM)  6. Therapeutic Activites  7. Therapeutic Exercise/Strengthening  8. Transfer Training      TREATMENT PLAN: Frequency/Duration: 3-4 times a week for duration of hospital stay  Rehabilitation Potential For Stated Goals: GOOD      RECOMMENDED REHABILITATION/EQUIPMENT: (at time of discharge pending progress): Continue Skilled Therapy and discharge needs TBD pending progress with therapy; [facility] PT vs rehab? ?.                   HISTORY:   History of Present Injury/Illness (Reason for Referral):  Per H&P, \"Patient is 67 y/o male w/ CKD 5 and last Cr 4.8 2 weeks ago but noted to have edema and metolazone added to his regimen. Recently seen by [facility] surgery due to non healing LE ulcers and possible vascular access for HD though he has not seen a nephrologist. Family has noted for last few days he has been lethargic, slurring speech that comes and goes. He has neuropathic pain in legs and given gabapentin though he only took a few doses. Aslo recenlty on bactrim and augmentin for leg wounds. Also noted to not urinate for 12 hours but with davenport in the ED put out 800ml in hour. No change in chronic sob. \"     Past Medical History/Comorbidities:   Mr. Tavia Powell  has a past medical history of Cancer ([facility] [address] Cellulitis; Chronic kidney disease; Chronic obstructive pulmonary disease ([facility] [address] Chronic pain; Depression; Diabetes ([facility] [address] Edema; Numbness; Thyroid disease; and Vitamin D deficiency. Mr. Garces Session  has a past surgical history that includes appendectomy; hernia repair; cholecystectomy; and prostatectomy. Social History/Living Environment:   Home Environment: Private residence  # Steps to Enter: 1  One/Two Story Residence: Two story, live on 1st floor  Lift Chair Available: No  Living Alone: No  Support Systems: Spouse/Significant Other/Partner, Child(shelia), Family member(s)  Patient Expects to be Discharged to[de-identified] Unknown  Current DME Used/Available at Home: Cane, straight, Grab bars  Tub or Shower Type: Shower  Prior Level of Function/Work/Activity:  Pt lives at home with wife in two story home with 1 step to enter. He stays on first floor. Uses cane for household ambulation and although he reports independence with navigating around house wife states she is always with him. History of dizziness and some falls. Number of Personal Factors/Comorbidities that affect the Plan of Care: 1-2: MODERATE COMPLEXITY   EXAMINATION:   Most Recent Physical Functioning:   Gross Assessment:  AROM: Generally decreased, functional  Strength: Generally decreased, functional  Coordination: Generally decreased, functional  Sensation: Impaired               Posture:     Balance:  Sitting - Static: Good (unsupported)  Sitting - Dynamic: Good (unsupported)  Standing - Static: Fair;Constant support  Standing - Dynamic : Fair Bed Mobility:  Sit to Supine: Minimum assistance (to B LE)  Scooting: Contact guard assistance  Wheelchair Mobility:     Transfers:  Sit to Stand: Minimum assistance; Moderate assistance  Stand to Sit: Contact guard assistance  Gait:     Base of Support: Center of gravity altered; Widened  Speed/Evita: Slow;Delayed  Step Length: Left shortened;Right shortened  Gait Abnormalities: Decreased step clearance;Shuffling gait;Trunk sway increased  Distance (ft): 5 Feet (ft)  Assistive Device: Cane, straight (1 HHA)  Ambulation - Level of Assistance: Contact guard assistance;Minimal assistance  Interventions: Safety awareness training; Tactile cues; Verbal cues       Body Structures Involved:  1. Nerves  2. Metabolic  3. Muscles Body Functions Affected:  1. Neuromusculoskeletal  2. Movement Related  3. Skin Related  4. Metobolic/Endocrine Activities and Participation Affected:  1. General Tasks and Demands  2. Mobility  3. Self Care  4. Domestic Life  5. Community, Social and Power [place]   Number of elements that affect the Plan of Care: 4+: HIGH COMPLEXITY   CLINICAL PRESENTATION:   Presentation: Evolving clinical presentation with changing clinical characteristics: MODERATE COMPLEXITY   CLINICAL DECISION MAKIN [facility] Inpatient Short Form  How much difficulty does the patient currently have. .. Unable A Lot A Little None   1. Turning over in bed (including adjusting bedclothes, sheets and blankets)? [ ] 1   [ ] 2   [X] 3   [ ] 4   2. Sitting down on and standing up from a chair with arms ( e.g., wheelchair, bedside commode, etc.)   [ ] 1   [X] 2   [ ] 3   [ ] 4   3. Moving from lying on back to sitting on the side of the bed? [ ] 1   [X] 2   [ ] 3   [ ] 4   How much help from another person does the patient currently need. .. Total A Lot A Little None   4. Moving to and from a bed to a chair (including a wheelchair)? [ ] 1   [X] 2   [ ] 3   [ ] 4   5. Need to walk in hospital room? [ ] 1   [X] 2   [ ] 3   [ ] 4   6. Climbing 3-5 steps with a railing? [X] 1   [ ] 2   [ ] 3   [ ] 4   © , Trustees of [employer], under license to [employer]. All rights reserved    Score:  Initial: 12 Most Recent: X (Date: -- )     Interpretation of Tool:  Represents activities that are increasingly more difficult (i.e. Bed mobility, Transfers, Gait). Score 24 23 22-20 19-15 14-10 9-7 6       Modifier CH CI CJ CK CL CM CN         · Mobility - Walking and Moving Around:               - CURRENT STATUS:    CL - 60%-79% impaired, limited or restricted               - GOAL STATUS:           CK - 40%-59% impaired, limited or restricted               - D/C STATUS:                       ---------------To be determined---------------  [place] / Product Type: Medicare /       Medical Necessity:     · Patient demonstrates good rehab potential due to higher previous functional level. Reason for Services/Other Comments:  · Patient continues to demonstrate capacity to improve strength, mobility, balance, transfers, activity tolerance which will increase independence, decrease amount of assistance required from caregiver and increase safety. Use of outcome tool(s) and clinical judgement create a POC that gives a: Questionable prediction of patient's progress: MODERATE COMPLEXITY                 TREATMENT:   (In addition to Assessment/Re-Assessment sessions the following treatments were rendered)   Pre-treatment Symptoms/Complaints:  \"I'm just so weak its riduculous\"  Pain: Initial:   Pain Intensity 1: 0  Post Session:  0/10      Therapeutic Activity: (    17 minutes): Therapeutic activities including Bed transfers, Chair transfers, Ambulation on level ground and supine exercises to improve mobility, strength, balance and coordination. Required minimal-moderate Safety awareness training; Tactile cues; Verbal cues to promote dynamic balance in standing and promote coordination of bilateral, lower extremity(s).       Date:  5/31/17 Date:   Date:     Activity/Exercise Parameters Parameters Parameters   Supine ankle pumps 15 X B     Supine quad set 15 X B     Supine glute set 15 X      Supine heel slide 15 X B     Supine hip abduction 15 X B                        Braces/Orthotics/Lines/Etc:   · issac catheter  Treatment/Session Assessment:    · Response to Treatment:  Pt requires min-mod assist with mobility and taking steps to bed  · Interdisciplinary Collaboration:  · Physical Therapist  · Registered Nurse  · After treatment position/precautions:  · Supine in bed, Bed/Chair-wheels locked, Bed in low position, Call light within reach and RN notified  · Compliance with Program/Exercises: Will assess as treatment progresses. · Recommendations/Intent for next treatment session: \"Next visit will focus on advancements to more challenging activities and reduction in assistance provided\".   Total Treatment Duration:  PT Patient Time In/Time Out  Time In: 1035  Time Out: [address]

## 2017-05-31 NOTE — PROGRESS NOTES
[facility]        Subjective: ESRD  Pt seen on dialysis #2      Review of Systems -   General ROS: negative for - chills, fatigue or fever  Respiratory ROS: no cough, shortness of breath, or wheezing  Cardiovascular ROS: no chest pain or dyspnea on exertion  Gastrointestinal ROS: no abdominal pain, change in bowel habits, or black or bloody stools  Genito-Urinary ROS: no dysuria, trouble voiding, or hematuria  Neurological ROS: no TIA or stroke symptoms        Objective:    Vitals:    05/31/17 0814 05/31/17 1043 05/31/17 1202 05/31/17 1228   BP: 108/54 130/58  151/61   Pulse:  72 (!) 43 61   Resp: 20 20     Temp: 97.3 °F (36.3 °C) 95.6 °F (35.3 °C)     SpO2: 93% 97%     Weight:       Height:           PE  Gen:in no acute distress  CV:reg rate  Chest: clear  Abd: soft  Ext/Access: rt IJ tunneled dialysis cath ok     . LAB  Recent Labs      05/29/17   0701  05/28/17   1557   WBC  5.7  6.8   HGB  7.6*  8.2*   HCT  24.4*  25.9*   PLT  224  266     Recent Labs      05/31/17   0701  05/29/17   0701  05/28/17   1557   NA  133*  134*  134*   K  4.8  5.0  5.3*   CL  94*  95*  95*   CO2  26  25  23   GLU  170*  132*  205*   BUN  114*  139*  135*   CREA  6.92*  8.25*  8.18*   PHOS   --   8.7*   --    CA  7.4*  7.4*  7.6*   ALB   --   2.6*  2.8*   TBILI   --    --   0.3   ALT   --    --   23   SGOT   --    --   10*           Radiology    A/P:   Patient Active Problem List   Diagnosis Code    Foot ulcer, left ([facility]) L97.529    PAD (peripheral artery disease) ([facility]) I73.9    Encephalopathy G93.40    CKD (chronic kidney disease) stage 5, GFR less than 15 ml/min ([facility]) N18.5    Uremia N19    Anemia of chronic renal failure, stage 5 ([facility]) N18.5, D63.1    COPD (chronic obstructive pulmonary disease) ([facility]) J44.9    Morbid obesity (HCC) E66.01    Hyperkalemia E87.5    DM (diabetes mellitus) (HCC) E11.9    TONJA on CPAP G47.33, Z99.89    Anasarca R60.1    Acute on chronic kidney failure (HCC) N17.9, N18.9       Pt seen on dialysis for clearance. Will get third run tomorrow.        Leigha Daniel MD

## 2017-05-31 NOTE — PROGRESS NOTES
Problem: Nutrition Deficit  Goal: *Optimize nutritional status  Nutrition  Reason for assessment: Referral received from nursing admission Malnutrition Screening Tool for recently lost 2-13# without trying and eating poorly due to decreased appetite. Assessment:   Diet order(s): Renal  Food/Nutrition Patient History: Patient with a history of CKD stage 5, COPD, PAD, diabetes and a non-healing left foot ulceration. Patient now with ESRD, s/p perma cath placement and HD session today. Patient reports that his appetite is not good but that is because the food is \"terrible\" here. Patient proceeds to take a phone call from his wife during the middle of the RD visit. Patient seems uninterested and states that he has not lost any weight and that he has no questions regarding nutrition. Patient with only 2 weights listed in the EMR and both are the same. No weight loss or gain can be verified at this time. Anthropometrics:Height: 5' 11\" (180.3 cm), Weight: (!) 167.8 kg (370 lb), Body mass index is 51.6 kg/(m^2). Macronutrient needs:  EER: 3793-3174 kcal /day (11-13 kcal/kg actual/listed BW)  EPR:  grams protein/day (1.2-1.5 grams/kg IBW) (HD patient)  Intake/Comparative Standards: Average intake for past 2 recorded meal(s): 88%. This potentially meets ~88% of kcal and ~80% of protein needs. Nutrition Diagnosis: Inadequate oral intake related to food preferences as evidenced by patient reports disliking the food here and that his appetite is poor d/t the \"terrible\" food. Intervention:  Meals and snacks: Continue current diet- discussed always available options with patient and encouraged the patient to use the always available section when he dislikes the options of the day. Nutrition Supplement Therapy: None at this time, patient declines and reports they are all [address] [place] [address], [phone]

## 2017-05-31 NOTE — PROGRESS NOTES
Hospitalist Progress Note    2017  Admit Date: 2017  3:57 PM   NAME: Hailey Simmons   :  1940   MRN:  [id number]   Attending: Blaine Argueta MD  PCP:  Luis Frederick MD    SUBJECTIVE:   Mr. Hailey Simmons is a 67 yo M admitted 17 for worsening renal failure/uremia, volume overload, and confusion. He was seen with his wife and daughter at bedside. He reports his legs and feet hurt. Discussed that gabapentin may be able to be added back at a lower dose when dialysis started. Seen by nephrology and plan for perm cath tomorrow and starting HD. Aside from leg and foot pain, Mr. Arsenio Al denies concerns. 17- seen without family at bedside. States he still feels poorly. Had permcath insertion today and first session of hemodialysis. Denies new concerns. 17- still feels poorly. Wife and daughter at bedside. He had 2nd session of hemodialysis today. Appetite still poor. He has a very negative attitude that seems to frustrate his wife and daughter. Asked if he felt depressed and he stated 'yes.'  He would like a trial of antidepressant.         Review of Systems negative with exception of pertinent positives noted above  PHYSICAL EXAM     Visit Vitals    /44 (BP 1 Location: Right arm, BP Patient Position: Sitting)    Pulse 77    Temp 97.3 °F (36.3 °C)    Resp 18    Ht 5' 11\" (1.803 m)    Wt (!) 167.8 kg (370 lb)    SpO2 97%    BMI 51.6 kg/m2      Temp (24hrs), Av.2 °F (36.2 °C), Min:95.6 °F (35.3 °C), Max:98.4 °F (36.9 °C)    Oxygen Therapy  O2 Sat (%): 97 % (17 1043)  Pulse via Oximetry: 75 beats per minute (17 195)  O2 Device: Room air (17 1523)  O2 Flow Rate (L/min): 1 l/min (17 0727)    Intake/Output Summary (Last 24 hours) at 17 1714  Last data filed at 17 0814   Gross per 24 hour   Intake              480 ml   Output              500 ml   Net              -20 ml      General: No acute distress, morbidly obese  Lungs:  CTA Bilaterally. Heart:  Regular rate and rhythm,  No murmur, rub, or gallop  Abdomen: Soft, Non distended, Non tender, Positive bowel sounds  Extremities: 2+ leg edema, ulcers noted on dorsal aspect of feet   Neurologic:  No focal deficits    Recent Results (from the past 24 hour(s))   GLUCOSE, POC    Collection Time: 05/30/17  8:20 PM   Result Value Ref Range    Glucose (POC) 239 (H) 65 - 563 mg/dL   METABOLIC PANEL, BASIC    Collection Time: 05/31/17  7:01 AM   Result Value Ref Range    Sodium 133 (L) 136 - 145 mmol/L    Potassium 4.8 3.5 - 5.1 mmol/L    Chloride 94 (L) 98 - 107 mmol/L    CO2 26 21 - 32 mmol/L    Anion gap 13 7 - 16 mmol/L    Glucose 170 (H) 65 - 100 mg/dL     (H) 8 - 23 MG/DL    Creatinine 6.92 (H) 0.8 - 1.5 MG/DL    GFR est AA 10 (L) >60 ml/min/1.73m2    GFR est non-AA 8 (L) >60 ml/min/1.73m2    Calcium 7.4 (L) 8.3 - 10.4 MG/DL   GLUCOSE, POC    Collection Time: 05/31/17  8:23 AM   Result Value Ref Range    Glucose (POC) 192 (H) 65 - 100 mg/dL   GLUCOSE, POC    Collection Time: 05/31/17 10:31 AM   Result Value Ref Range    Glucose (POC) 243 (H) 65 - 100 mg/dL   PLEASE READ & DOCUMENT PPD TEST IN 48 HRS    Collection Time: 05/31/17 12:00 PM   Result Value Ref Range    PPD  Negative    mm Induration 0mm mm   GLUCOSE, POC    Collection Time: 05/31/17  4:20 PM   Result Value Ref Range    Glucose (POC) 185 (H) 65 - 100 mg/dL     IR INSERT TUNL CVC W/O PORT OVER 5 YR   Final Result   Impression: Technically successful tunneled hemodialysis catheter placement. Plan: Recover for 1 hour. Catheter is ready for use. Suture should be removed in   2 weeks. US RETROPERITONEUM COMP   Final Result   Impression:  Abdominal aortic ectasia, right renal cyst         XR FOOT LT MIN 3 V   Final Result   IMPRESSION: No periostitis or erosions. Extensive vascular calcifications. XR CHEST PORT   Final Result   IMPRESSION:  Cardiomegaly suspected.  Mild interstitial prominence which could be   acute or chronic. Minimal band of atelectasis left midlung zone. CT UROGRAM WO CONT   Final Result   IMPRESSION:  Negative for radiopaque urinary tract calculi or evidence of   obstruction. Other findings as above. CT HEAD WO CONT   Final Result   IMPRESSION:  Negative for acute intracranial abnormality. Chronic changes. ASSESSMENT      Active Hospital Problems    Diagnosis Date Noted    Moderate single current episode of major depressive disorder ([facility] [address] 05/31/2017    Anasarca 05/29/2017    Acute on chronic kidney failure ([address] 05/29/2017    Encephalopathy 05/28/2017    CKD (chronic kidney disease) stage 5, GFR less than 15 ml/min ([facility]) 05/28/2017    Uremia 05/28/2017    Anemia of chronic renal failure, stage 5 ([facility] [address] 05/28/2017    COPD (chronic obstructive pulmonary disease) ([facility] [address] 05/28/2017    Morbid obesity ([facility] [address] 05/28/2017    Hyperkalemia 05/28/2017    DM (diabetes mellitus) ([facility] [address] 05/28/2017    TONJA on CPAP 05/28/2017    Foot ulcer, left ([facility] [address] 05/25/2017    PAD (peripheral artery disease) ([facility] [address] 05/25/2017     Plan:  · ESRD- s/p permcath insertion and hemodialysis session 2. Getting 3rd session tomorrow per nephro. · Encephalopathy/AMS- improved. Likley related to worsening renal failure and recent start of gabapentin. · Leg pain/neuropathy- prn norco for now. May add back lower dose of gabapentin soon. · DM- blood sugars decent. Continue current regimen. · Depression- start lexapro 5 mg. DVT Prophylaxis: Heparin    Signed By: Shaila Saxena.  Lauro Granados MD     May 31, 2017

## 2017-06-01 NOTE — PROGRESS NOTES
No BP, lab draws, or IV sticks in the left arm. Will plan for a left arm AVG. This can be performed as an outpatient if the patient is deemed stable by his primary attending.

## 2017-06-01 NOTE — PROGRESS NOTES
Referred to [facility] for PT and Nursing and ok to add in SW. Patient is new to dialysis and trying to cope with the concept and how he is physically feeling. Declines rehab. '  Has a dialysis slot Monday, Wednesday, Friday and I am waiting for the fax from them to confirm chair time. Will contact nephrology to see if patient may have a short run tomorrow before discharging and then start outpatient on Monday. Gerald Dang    [address] [facility]    Phone [phone] [facility]

## 2017-06-01 NOTE — PROGRESS NOTES
On dialysis via R tunneled dialysis catheter. Vital signs 143/61, hr 65. Pt noted alert and oriented x 4. Will continue to monitor throughout treatment.

## 2017-06-01 NOTE — PROGRESS NOTES
[address]  Face to Face Encounter    Patients Name: Nemiah Lefort    YOB: 1940    Ordering Physician: Dr. Cande Saunders     Primary Diagnosis: Encephalopathy    Date of Face to Face:   6/1/2017                                  Face to Face Encounter findings are related to primary reason for home care:   yes. 1. I certify that the patient needs intermittent care as follows: skilled nursing care:  teaching/training of new dialysis   physical therapy: strengthening, stretching/ROM and pt/caregiver education  occupational therapy:  ADL safety (ie. cooking, bathing, dressing) and pt/caregiver education    2. I certify that this patient is homebound, that is: 1) patient requires the use of a walker device, special transportation, or assistance of another to leave the home; or 2) patient's condition makes leaving the home medically contraindicated; and 3) patient has a normal inability to leave the home and leaving the home requires considerable and taxing effort. Patient may leave the home for infrequent and short duration for medical reasons, and occasional absences for non-medical reasons. Homebound status is due to the following functional limitations: Patient with strength deficits limiting the performance of all ADL's without caregiver assistance or the use of an assistive device. 3. I certify that this patient is under my care and that I, or a nurse practitioner or  [id number], or clinical nurse specialist, or certified nurse midwife, working with me, had a Face-to-Face Encounter that meets the physician Face-to-Face Encounter requirements.   The following are the clinical findings from the [address] [place] [address] encounter that support the need for skilled services and is a summary of the encounter:     See discharge summary/hospital chart      [facility]  6/1/2017      THE FOLLOWING TO BE COMPLETED BY THE COMMUNITY PHYSICIAN:    I concur with the findings described above from the F2F encounter that this patient is homebound and in need of a skilled service.     Certifying Physician: _____________________________________      Printed Certifying Physician Name: _____________________________________    Date: _________________

## 2017-06-01 NOTE — DIALYSIS
HD treatment completed without complication. Total of 1.5 kgs removed. VS stable, bp 171/76 at end of tx. CVC dressing clean, dry, and intact, tego caps intact, bilateral lumen flushed with 9cc NS and curos applied. Transport contacted for return to room. No complaints at this time.

## 2017-06-01 NOTE — PROGRESS NOTES
[facility]    Follow-Up on: new ESRD    HPI:PT seen on HD #3 today. Still feels poorly at times. ROS:  Denies CP, SOB.     Current Facility-Administered Medications   Medication Dose Route Frequency    escitalopram oxalate (LEXAPRO) tablet 5 mg  5 mg Oral QPM    calcium acetate (PHOSLO) capsule 667 mg  1 Cap Oral TID WITH MEALS    metOLazone (ZAROXOLYN) tablet 5 mg  5 mg Oral BID    tamsulosin (FLOMAX) capsule 0.4 mg  0.4 mg Oral DAILY    epoetin fam (EPOGEN;PROCRIT) injection 10,000 Units  10,000 Units SubCUTAneous Q7D    povidone-iodine (BETADINE) 10 % topical solution   Topical BID    bumetanide (BUMEX) tablet 2 mg  2 mg Oral BID    sodium chloride (NS) flush 5-10 mL  5-10 mL IntraVENous Q8H    HYDROcodone-acetaminophen (NORCO) 5-325 mg per tablet 1 Tab  1 Tab Oral Q4H PRN    ondansetron (ZOFRAN) injection 4 mg  4 mg IntraVENous Q4H PRN    heparin (porcine) injection 5,000 Units  5,000 Units SubCUTAneous Q8H    aspirin delayed-release tablet 81 mg  81 mg Oral DAILY    calcitRIOL (ROCALTROL) capsule 0.25 mcg  0.25 mcg Oral DAILY    levothyroxine (SYNTHROID) tablet 125 mcg  125 mcg Oral ACB    insulin detemir (LEVEMIR) injection 10 Units  10 Units SubCUTAneous QHS    budesonide (PULMICORT) 500 mcg/2 ml nebulizer suspension  500 mcg Nebulization BID RT    And    albuterol CONCENTRATE 2.5mg/0.5 mL neb soln  2.5 mg Nebulization Q6H RT    insulin regular (NOVOLIN R, HUMULIN R) injection   SubCUTAneous AC&HS       Exam:  Vitals:    06/01/17 0605 06/01/17 0743 06/01/17 0836 06/01/17 0856   BP: 137/48  143/61 124/47   Pulse: 87  65 68   Resp: 18      Temp: 98.2 °F (36.8 °C)      SpO2: 97%      Weight:  (!) 169.6 kg (374 lb)     Height:             Intake/Output Summary (Last 24 hours) at 06/01/17 0900  Last data filed at 06/01/17 0818   Gross per 24 hour   Intake             1280 ml   Output                0 ml   Net             1280 ml     PE:  GEN - in no distress, morbidly obese  CV - regular, no murmur, no rub  Lung - clear bilaterally  Abd - soft, nontender  Ext - 2-3+ edema    Labs  Recent Labs      06/01/17   0716   HGB  8.0*   HCT  25.2*     Recent Labs      05/31/17   0701   NA  133*   K  4.8   CL  94*   CO2  26   BUN  114*   CREA  6.92*   GLU  170*   CA  7.4*     No results for input(s): PH, PCO2, PO2, PCO2 in the last 72 hours. Problem List:  Patient Active Problem List    Diagnosis Date Noted    Moderate single current episode of major depressive disorder ([address] 05/31/2017    Anasarca 05/29/2017    Acute on chronic kidney failure ([address] 05/29/2017    Encephalopathy 05/28/2017    CKD (chronic kidney disease) stage 5, GFR less than 15 ml/min ([address] 05/28/2017    Uremia 05/28/2017    Anemia of chronic renal failure, stage 5 ([address] 05/28/2017    COPD (chronic obstructive pulmonary disease) [facility] 05/28/2017    Morbid obesity ([address] 05/28/2017    Hyperkalemia 05/28/2017    DM (diabetes mellitus) ([address] 05/28/2017    TONJA on CPAP 05/28/2017    Foot ulcer, left ([address] 05/25/2017    PAD (peripheral artery disease) ([address] 05/25/2017       Issues Addressed By Nephrology:  1. ESRD: New. Wants home dialysis. Appreciate Dr. Ivelisse Guthrie evaluation. PD possibilities limited by abdominal surgeries and his obesity. HD #3 today. Outpatient slot at [address] pending. 2. Anemia with ESRD  3.  Hyperphosphatemia    Plan:  -Check labs in the AM  -Dialysis next on Saturday

## 2017-06-01 NOTE — PROGRESS NOTES
Hospitalist Progress Note    2017  Admit Date: 2017  3:57 PM   NAME: Alivia Presley   :  1940   MRN:  [id number]   Attending: Sandra Aguilar MD  PCP:  Nathan Méndez MD    SUBJECTIVE:   Mr. Alivia Presley is a 67 yo M admitted 17 for worsening renal failure/uremia, volume overload, and confusion. He was seen with his wife and daughter at bedside. He reports his legs and feet hurt. Discussed that gabapentin may be able to be added back at a lower dose when dialysis started. Seen by nephrology and plan for perm cath tomorrow and starting HD. Aside from leg and foot pain, Mr. Duncan Elmore denies concerns. 17- seen without family at bedside. States he still feels poorly. Had permcath insertion today and first session of hemodialysis. Denies new concerns. 17- still feels poorly. Wife and daughter at bedside. He had 2nd session of hemodialysis today. Appetite still poor. He has a very negative attitude that seems to frustrate his wife and daughter. Asked if he felt depressed and he stated 'yes.'  He would like a trial of antidepressant. 17- seen with wife and daughter at bedside. They feel he is more confused today. Concerned as they report he weighed 370 pounds prior to admission and now weighs 374 pounds, despite not eating much and getting dialysis. He wants to go home soon. Denies other new concerns.   Upon review of dialysis output, looks like he has only had 3.5 liters removed total.      Review of Systems negative with exception of pertinent positives noted above  PHYSICAL EXAM     Visit Vitals    /48    Pulse 87    Temp 98.2 °F (36.8 °C)    Resp 18    Ht 5' 11\" (1.803 m)    Wt (!) 167.8 kg (370 lb)    SpO2 97%    BMI 51.6 kg/m2      Temp (24hrs), Av.2 °F (36.2 °C), Min:95.6 °F (35.3 °C), Max:98.2 °F (36.8 °C)    Oxygen Therapy  O2 Sat (%): 97 % (17 0605)  Pulse via Oximetry: 72 beats per minute (17 0149)  O2 Device: Nasal cannula (06/01/17 0149)  O2 Flow Rate (L/min): 3 l/min (06/01/17 0149)  FIO2 (%): 32 % (06/01/17 0149)    Intake/Output Summary (Last 24 hours) at 06/01/17 0733  Last data filed at 06/01/17 0605   Gross per 24 hour   Intake             1280 ml   Output                0 ml   Net             1280 ml      General: No acute distress, morbidly obese  Lungs:  CTA Bilaterally. Heart:  Regular rate and rhythm,  No murmur, rub, or gallop  Abdomen: Soft, Non distended, Non tender, Positive bowel sounds  Extremities: 2+ leg edema, ulcers noted on dorsal aspect of feet   Neurologic:  No focal deficits    Recent Results (from the past 24 hour(s))   GLUCOSE, POC    Collection Time: 05/31/17  8:23 AM   Result Value Ref Range    Glucose (POC) 192 (H) 65 - 100 mg/dL   GLUCOSE, POC    Collection Time: 05/31/17 10:31 AM   Result Value Ref Range    Glucose (POC) 243 (H) 65 - 100 mg/dL   PLEASE READ & DOCUMENT PPD TEST IN 48 HRS    Collection Time: 05/31/17 12:00 PM   Result Value Ref Range    PPD  Negative    mm Induration 0mm mm   GLUCOSE, POC    Collection Time: 05/31/17  4:20 PM   Result Value Ref Range    Glucose (POC) 185 (H) 65 - 100 mg/dL   GLUCOSE, POC    Collection Time: 05/31/17  8:31 PM   Result Value Ref Range    Glucose (POC) 247 (H) 65 - 100 mg/dL   GLUCOSE, POC    Collection Time: 06/01/17  7:11 AM   Result Value Ref Range    Glucose (POC) 144 (H) 65 - 100 mg/dL     IR INSERT TUNL CVC W/O PORT OVER 5 YR   Final Result   Impression: Technically successful tunneled hemodialysis catheter placement. Plan: Recover for 1 hour. Catheter is ready for use. Suture should be removed in   2 weeks. US RETROPERITONEUM COMP   Final Result   Impression:  Abdominal aortic ectasia, right renal cyst         XR FOOT LT MIN 3 V   Final Result   IMPRESSION: No periostitis or erosions. Extensive vascular calcifications. XR CHEST PORT   Final Result   IMPRESSION:  Cardiomegaly suspected.  Mild interstitial prominence which could be   acute or chronic. Minimal band of atelectasis left midlung zone. CT UROGRAM WO CONT   Final Result   IMPRESSION:  Negative for radiopaque urinary tract calculi or evidence of   obstruction. Other findings as above. CT HEAD WO CONT   Final Result   IMPRESSION:  Negative for acute intracranial abnormality. Chronic changes. ASSESSMENT      Active Hospital Problems    Diagnosis Date Noted    Moderate single current episode of major depressive disorder ([facility] 05/31/2017    Anasarca 05/29/2017    Acute on chronic kidney failure ([facility] 05/29/2017    Encephalopathy 05/28/2017    CKD (chronic kidney disease) stage 5, GFR less than 15 ml/min ([facility]) 05/28/2017    Uremia 05/28/2017    Anemia of chronic renal failure, stage 5 ([facility] 05/28/2017    COPD (chronic obstructive pulmonary disease) ([facility] 05/28/2017    Morbid obesity ([facility] 05/28/2017    Hyperkalemia 05/28/2017    DM (diabetes mellitus) ([facility] 05/28/2017    TONJA on CPAP 05/28/2017    Foot ulcer, left ([address] 05/25/2017    PAD (peripheral artery disease) ([facility] 05/25/2017     Plan:  · ESRD- s/p permcath insertion and hemodialysis session 2. Getting 3rd session today per nephro. · Encephalopathy/AMS- waxes and wanes. Likley related to uremia. · Leg pain/neuropathy- prn norco for now. May add back lower dose of gabapentin soon. · DM- blood sugars decent. Continue current regimen. · Depression- lexapro 5 mg. DVT Prophylaxis: Heparin    Signed By: Wayne Hurley.  Cande Saunders MD     June 1, 2017

## 2017-06-02 PROBLEM — Z99.2 ESRD ON HEMODIALYSIS (HCC): Status: ACTIVE | Noted: 2017-01-01

## 2017-06-02 PROBLEM — N18.6 ESRD ON HEMODIALYSIS (HCC): Status: ACTIVE | Noted: 2017-01-01

## 2017-06-02 PROBLEM — R33.9 URINARY RETENTION: Status: ACTIVE | Noted: 2017-01-01

## 2017-06-02 NOTE — PROGRESS NOTES
Discharge instructions reviewed with patient, verbalized understanding. Denies any further questions or concerns. AVS & scripts given. Patient discharged to home with family. Chen care reviewed. Papers signed, and in chart.

## 2017-06-02 NOTE — PROGRESS NOTES
Oxygen Qualifier       Room air: SpO2 with O2 and liter flow   Resting SpO2  88% before Dialysis . 99% after Dialysis  93% on 2L before Dialysis   Ambulating SpO2           Completed by:    Lilo Mosquera

## 2017-06-02 NOTE — DISCHARGE INSTRUCTIONS
DISCHARGE SUMMARY from Nurse    The following personal items are in your possession at time of discharge:    Dental Appliances: None  Visual Aid: Glasses     Home Medications: None  Jewelry: Bracelet, Watch, Necklace  Clothing: At bedside, Pants, Shirt  Other Valuables: Cell Phone             PATIENT INSTRUCTIONS:    After general anesthesia or intravenous sedation, for 24 hours or while taking prescription Narcotics:  · Limit your activities  · Do not drive and operate hazardous machinery  · Do not make important personal or business decisions  · Do  not drink alcoholic beverages  · If you have not urinated within 8 hours after discharge, please contact your surgeon on call. Report the following to your surgeon:  · Excessive pain, swelling, redness or odor of or around the surgical area  · Temperature over 100.5  · Nausea and vomiting lasting longer than 4 hours or if unable to take medications  · Any signs of decreased circulation or nerve impairment to extremity: change in color, persistent  numbness, tingling, coldness or increase pain  · Any questions        What to do at Home:  Recommended activity: Activity as tolerated,     If you experience any of the following symptoms with increased problems, please follow up with MD.      *  Please give a list of your current medications to your Primary Care Provider. *  Please update this list whenever your medications are discontinued, doses are      changed, or new medications (including over-the-counter products) are added. *  Please carry medication information at all times in case of emergency situations. These are general instructions for a healthy lifestyle:    No smoking/ No tobacco products/ Avoid exposure to second hand smoke    Surgeon General's Warning:  Quitting smoking now greatly reduces serious risk to your health.     Obesity, smoking, and sedentary lifestyle greatly increases your risk for illness    A healthy diet, regular physical exercise & weight monitoring are important for maintaining a healthy lifestyle    You may be retaining fluid if you have a history of heart failure or if you experience any of the following symptoms:  Weight gain of 3 pounds or more overnight or 5 pounds in a week, increased swelling in our hands or feet or shortness of breath while lying flat in bed. Please call your doctor as soon as you notice any of these symptoms; do not wait until your next office visit. Recognize signs and symptoms of STROKE:    F-face looks uneven    A-arms unable to move or move unevenly    S-speech slurred or non-existent    T-time-call 911 as soon as signs and symptoms begin-DO NOT go       Back to bed or wait to see if you get better-TIME IS BRAIN. Warning Signs of HEART ATTACK     Call 911 if you have these symptoms:   Chest discomfort. Most heart attacks involve discomfort in the center of the chest that lasts more than a few minutes, or that goes away and comes back. It can feel like uncomfortable pressure, squeezing, fullness, or pain.  Discomfort in other areas of the upper body. Symptoms can include pain or discomfort in one or both arms, the back, neck, jaw, or stomach.  Shortness of breath with or without chest discomfort.  Other signs may include breaking out in a cold sweat, nausea, or lightheadedness. Don't wait more than five minutes to call 911 - MINUTES MATTER! Fast action can save your life. Calling 911 is almost always the fastest way to get lifesaving treatment. Emergency Medical Services staff can begin treatment when they arrive -- up to an hour sooner than if someone gets to the hospital by car. The discharge information has been reviewed with the patient and spouse. The patient and spouse verbalized understanding. Discharge medications reviewed with the patient and spouse and appropriate educational materials and side effects teaching were provided.

## 2017-06-02 NOTE — DISCHARGE SUMMARY
Hospitalist Discharge Summary     Patient ID:  Chica Motta  [id number]  68 y.o.  1940  Admit date: 5/28/2017  3:57 PM  Discharge date and time: 6/7/2017  Attending: No att. providers found  PCP:  Silvio Dempsey MD  Treatment Team: Consulting Provider: Candy Hartman MD; Consulting Provider: Clara Downey MD; Consulting Provider: Brittney Corbett MD; Care Manager: Christy Houston; Care Manager: LUZMARIA Partida    Principal Diagnosis ESRD on hemodialysis [facility])   Principal Problem:    ESRD on hemodialysis ([address] (6/2/2017)    Active Problems:    Foot ulcer, left ([address] (5/25/2017)      PAD (peripheral artery disease) ([address] (5/25/2017)      Encephalopathy (5/28/2017)      CKD (chronic kidney disease) stage 5, GFR less than 15 ml/min ([address] (5/28/2017)      Uremia (5/28/2017)      Anemia of chronic renal failure, stage 5 ([address] (5/28/2017)      COPD (chronic obstructive pulmonary disease) ([address] (5/28/2017)      Morbid obesity ([address] (5/28/2017)      Hyperkalemia (5/28/2017)      DM (diabetes mellitus) ([address] (5/28/2017)      TONJA on CPAP (5/28/2017)      Anasarca (5/29/2017)      Acute on chronic kidney failure ([address] (5/29/2017)      Moderate single current episode of major depressive disorder ([address] (5/31/2017)      Urinary retention (6/2/2017)      Overview: 800 ml drained in ED with davenport catheter placement. HPI:  'Patient is 69 y/o male w/ CKD 5 and last Cr 4.8 2 weeks ago but noted to have edema and metolazone added to his regimen. Recently seen by vas surgery due to non healing LE ulcers and possible vascular access for HD though he has not seen a nephrologist. Family has noted for last few days he has been lethargic, slurring speech that comes and goes. He has neuropathic pain in legs and given gabapentin though he only took a few doses. Aslo recenlty on bactrim and augmentin for leg wounds. Also noted to not urinate for 12 hours but with davenport in the ED put out 800ml in hour.  No change in chronic sob.'    Hospital Course:  He had nephrology evaluation. Deemed to be in ESRD and needing HD. Permcath placed 5/30/17. He received 4 sessions of hemodialysis while here. His Cr improved from 8.25 downt to 3.81 and BUN droped from 139 down to 3.81. He was noted to have urinary retention with 800 ml of urine drained from bladder on admission. Urology consulted and recommended to leave davenport catheter in place for 5-7 days and started him on flomax. He will follow up with urology early next week for davenport removal.  Given his vascular disease and need for evaluation for dialysis access, vascular surgery consulted. Deemed him not a candidate for long-term PD and have set him up with outpatient evaluation for AV fistula. He experienced intermittent confusion and some visual hallucinations. This was felt to be due to the metabolic encephalopathy from renal disease and also lack of sleep during hospitalization. On the day of discharge, he was deemed stable to return home and he and his wife were ready to return home with home health/PT. Significant Diagnostic Studies:       Labs: Results:       Chemistry Recent Labs      06/05/17   0510   GLU  187*   NA  132*   K  4.0   CL  93*   CO2  27   BUN  43*   CREA  4.38*   CA  8.4   AGAP  12   AP  79   TP  6.6   ALB  2.3*   GLOB  4.3*   AGRAT  0.5*      CBC w/Diff Recent Labs      06/05/17   0510   WBC  26.2*   RBC  2.61*   HGB  7.8*   HCT  25.0*   PLT  212   GRANS  88*   LYMPH  5*   EOS  0*      Cardiac Enzymes No results for input(s): CPK, CKND1, WALI in the last 72 hours. No lab exists for component: CKRMB, TROIP   Coagulation No results for input(s): PTP, INR, APTT in the last 72 hours.     No lab exists for component: INREXT, INREXT    Lipid Panel No results found for: CHOL, CHOLPOCT, CHOLX, CHLST, CHOLV, [id number], HDL, LDL, NLDLCT, DLDL, LDLC, DLDLP, [id number], VLDLC, VLDL, TGLX, TRIGL, TRIGP, TGLPOCT, CHHD, CHHDX   BNP No results for input(s): BNPP in the last 72 hours. Liver Enzymes Recent Labs      06/05/17   0510   TP  6.6   ALB  2.3*   AP  79   SGOT  42*      Thyroid Studies No results found for: T4, T3U, TSH, TSHEXT, TSHEXT         Discharge Exam:  Visit Vitals    /60 (BP 1 Location: Right arm, BP Patient Position: At rest)    Pulse 76    Temp 97.5 °F (36.4 °C)    Resp 16    Ht 5' 11\" (1.803 m)    Wt (!) 164.2 kg (362 lb)    SpO2 93%    BMI 50.49 kg/m2     General appearance: alert, cooperative, no distress, morbidly obese  Lungs: clear to auscultation bilaterally  Heart: regular rate and rhythm, S1, S2 normal, no murmur, click, rub or gallop  Abdomen: soft, non-tender. Bowel sounds normal. No masses,  no organomegaly  Extremities: 2+ leg edema, pedal ulcers present  Neurologic: Grossly normal    Disposition: home  Discharge Condition: stable  Patient Instructions:   Discharge Medication List as of 6/2/2017  4:24 PM      START taking these medications    Details   calcium acetate (PHOSLO) 667 mg cap Take 1 Cap by mouth three (3) times daily (with meals). , Print, Disp-90 Cap, R-0      escitalopram oxalate (LEXAPRO) 5 mg tablet Take 1 Tab by mouth every evening. Indications: major depressive disorder, Normal, Disp-30 Tab, R-0      tamsulosin (FLOMAX) 0.4 mg capsule Take 1 Cap by mouth daily. , Print, Disp-30 Cap, R-0         CONTINUE these medications which have NOT CHANGED    Details   insulin detemir (LEVEMIR FLEXPEN) 100 unit/mL (3 mL) inpn 10 Units by SubCUTAneous route nightly. Indications: type 2 diabetes mellitus, Historical Med      metOLazone (ZAROXOLYN) 5 mg tablet Take  by mouth daily. , Historical Med      bumetanide (BUMEX) 2 mg tablet Take 2 mg by mouth two (2) times a day., Historical Med      budesonide-formoterol (SYMBICORT) 160-4.5 mcg/actuation HFA inhaler Take 2 Puffs by inhalation two (2) times a day., Historical Med      levothyroxine (LEVOXYL) 125 mcg tablet Take  by mouth Daily (before breakfast). , Historical Med calcitRIOL (ROCALTROL) 0.25 mcg capsule Take 0.25 mcg by mouth daily. , Historical Med      aspirin delayed-release 81 mg tablet Take  by mouth daily. , Historical Med      Biotin 2,500 mcg cap Take  by mouth., Historical Med      acetaminophen-codeine (TYLENOL-CODEINE #3) 300-30 mg per tablet Take 1 Tab by mouth every four (4) hours as needed for Pain., Historical Med         STOP taking these medications       amoxicillin-clavulanate (AUGMENTIN) 875-125 mg per tablet Comments:   Reason for Stopping:         gabapentin (NEURONTIN) 300 mg capsule Comments:   Reason for Stopping:               Activity: PT/OT per Home Health  Diet: Renal Diet  Wound Care: Per wound care- Recommend betadine paint bid until blood flow is improved. Follow-up  · Hemodialysis on M/W/F  · Dr. Nichole Singletary (urology) or partner in 3 days for davenport catheter removal.    · Dr. Jluis Colbert as scheduled. Time spent to discharge patient 35 minutes  Signed:  Eunice Petty.  Connie Brothers MD  6/7/2017  1:54 PM

## 2017-06-02 NOTE — PROGRESS NOTES
Hospitalist Progress Note    2017  Admit Date: 2017  3:57 PM   NAME: Concepcion Prader   :  1940   MRN:  [id number]   Attending: Tad White MD  PCP:  Taisha Griffin MD    SUBJECTIVE:   Mr. Concepcion Prader is a 67 yo M admitted 17 for worsening renal failure/uremia, volume overload, and confusion. He was seen with his wife and daughter at bedside. He reports his legs and feet hurt. Discussed that gabapentin may be able to be added back at a lower dose when dialysis started. Seen by nephrology and plan for perm cath tomorrow and starting HD. Aside from leg and foot pain, Mr. Jessica Macario denies concerns. 17- seen without family at bedside. States he still feels poorly. Had permcath insertion today and first session of hemodialysis. Denies new concerns. 17- still feels poorly. Wife and daughter at bedside. He had 2nd session of hemodialysis today. Appetite still poor. He has a very negative attitude that seems to frustrate his wife and daughter. Asked if he felt depressed and he stated 'yes.'  He would like a trial of antidepressant. 17- seen with wife and daughter at bedside. They feel he is more confused today. Concerned as they report he weighed 370 pounds prior to admission and now weighs 374 pounds, despite not eating much and getting dialysis. He wants to go home soon. Denies other new concerns. Upon review of dialysis output, looks like he has only had 3.5 liters removed total.  17- seen with wife and daughter at bedside. States he is doing some better than yesterday and less agitated, but he is still 'loopy.'  Oriented to person, place, and time, but he is seeing 'people fly around the room' and sees 'dust floating.'  Weight today reads 362 pounds. Doubt 374 entered yesterday was accurate.         Review of Systems negative with exception of pertinent positives noted above  PHYSICAL EXAM     Visit Vitals    /60 (BP 1 Location: Right arm, BP Patient Position: At rest)    Pulse 82    Temp 97.4 °F (36.3 °C)    Resp 18    Ht 5' 11\" (1.803 m)    Wt (!) 164.2 kg (362 lb)    SpO2 94%    BMI 50.49 kg/m2      Temp (24hrs), Av °F (36.1 °C), Min:96.1 °F (35.6 °C), Max:97.4 °F (36.3 °C)    Oxygen Therapy  O2 Sat (%): 94 % (17)  Pulse via Oximetry: 82 beats per minute (17)  O2 Device: Nasal cannula (17)  O2 Flow Rate (L/min): 2 l/min (17)  FIO2 (%): 32 % (17 014)    Intake/Output Summary (Last 24 hours) at 17 0904  Last data filed at 17 1358   Gross per 24 hour   Intake              240 ml   Output             1500 ml   Net            -1260 ml      General: No acute distress, morbidly obese  Lungs:  CTA Bilaterally.    Heart:  Regular rate and rhythm,  No murmur, rub, or gallop  Abdomen: Soft, Non distended, Non tender, Positive bowel sounds  Extremities: 2+ leg edema, ulcers noted on dorsal aspect of feet, one ulcer on L leg  Neurologic:  No focal deficits    Recent Results (from the past 24 hour(s))   GLUCOSE, POC    Collection Time: 17 10:23 AM   Result Value Ref Range    Glucose (POC) 143 (H) 65 - 100 mg/dL   PLEASE READ & DOCUMENT PPD TEST IN 72 HRS    Collection Time: 17 12:51 PM   Result Value Ref Range    PPD  Negative    mm Induration  0mm   GLUCOSE, POC    Collection Time: 17  4:58 PM   Result Value Ref Range    Glucose (POC) 210 (H) 65 - 100 mg/dL   GLUCOSE, POC    Collection Time: 17  9:32 PM   Result Value Ref Range    Glucose (POC) 209 (H) 65 - 100 mg/dL   GLUCOSE, POC    Collection Time: 17  7:09 AM   Result Value Ref Range    Glucose (POC) 116 (H) 65 - 618 mg/dL   METABOLIC PANEL, BASIC    Collection Time: 17  7:14 AM   Result Value Ref Range    Sodium 136 136 - 145 mmol/L    Potassium 4.0 3.5 - 5.1 mmol/L    Chloride 98 98 - 107 mmol/L    CO2 29 21 - 32 mmol/L    Anion gap 9 7 - 16 mmol/L    Glucose 107 (H) 65 - 100 mg/dL    BUN 49 (H) 8 - 23 MG/DL    Creatinine 3.81 (H) 0.8 - 1.5 MG/DL    GFR est AA 20 (L) >60 ml/min/1.73m2    GFR est non-AA 16 (L) >60 ml/min/1.73m2    Calcium 8.3 8.3 - 10.4 MG/DL   PHOSPHORUS    Collection Time: 06/02/17  7:14 AM   Result Value Ref Range    Phosphorus 4.0 (H) 2.3 - 3.7 MG/DL     IR INSERT TUNL CVC W/O PORT OVER 5 YR   Final Result   Impression: Technically successful tunneled hemodialysis catheter placement. Plan: Recover for 1 hour. Catheter is ready for use. Suture should be removed in   2 weeks. US RETROPERITONEUM COMP   Final Result   Impression:  Abdominal aortic ectasia, right renal cyst         XR FOOT LT MIN 3 V   Final Result   IMPRESSION: No periostitis or erosions. Extensive vascular calcifications. XR CHEST PORT   Final Result   IMPRESSION:  Cardiomegaly suspected. Mild interstitial prominence which could be   acute or chronic. Minimal band of atelectasis left midlung zone. CT UROGRAM WO CONT   Final Result   IMPRESSION:  Negative for radiopaque urinary tract calculi or evidence of   obstruction. Other findings as above. CT HEAD WO CONT   Final Result   IMPRESSION:  Negative for acute intracranial abnormality. Chronic changes.                 ASSESSMENT      Active Hospital Problems    Diagnosis Date Noted    Moderate single current episode of major depressive disorder ([address] 05/31/2017    Anasarca 05/29/2017    Acute on chronic kidney failure ([address] 05/29/2017    Encephalopathy 05/28/2017    CKD (chronic kidney disease) stage 5, GFR less than 15 ml/min ([facility]) 05/28/2017    Uremia 05/28/2017    Anemia of chronic renal failure, stage 5 ([address] 05/28/2017    COPD (chronic obstructive pulmonary disease) ([address] 05/28/2017    Morbid obesity ([address] 05/28/2017    Hyperkalemia 05/28/2017    DM (diabetes mellitus) ([address] 05/28/2017    TONJA on CPAP 05/28/2017    Foot ulcer, left ([address] 05/25/2017    PAD (peripheral artery disease) ([address] 05/25/2017 Plan:  · ESRD- s/p permcath insertion and hemodialysis session 2. Getting 4th session today per nephro. · Encephalopathy/AMS- waxes and wanes. Likley related to uremia and hospitalization. · Leg pain/neuropathy- prn norco for now. May add back lower dose of gabapentin soon. · DM- blood sugars decent. Continue current regimen. · Depression- lexapro 5 mg. Dispo- his wife wants to take him home today. Await nephrology input and will discharge if okay with nephrology. DVT Prophylaxis: Heparin    Signed By: Malvin Smith.  Alka Castellano MD     June 2, 2017

## 2017-06-02 NOTE — PROGRESS NOTES
Patients family concerned about progression of confusion. States patient even tried to call 911 today. Will make sure MD is aware.

## 2017-06-02 NOTE — DIALYSIS
Hemodialysis begun via right perm cath without difficulty by Sana Cui RN. All machine alarms verified and working properly. Patient is alert and denies complaints. Will continue to monitor while on dialysis.

## 2017-06-02 NOTE — PROGRESS NOTES
[facility]        Subjective: ESRD  Pt seen on dialysis #4      Review of Systems -   General ROS: negative for - chills, fatigue or fever  Respiratory ROS: no cough, shortness of breath, or wheezing  Cardiovascular ROS: no chest pain or dyspnea on exertion  Gastrointestinal ROS: no abdominal pain, change in bowel habits, or black or bloody stools  Genito-Urinary ROS: no dysuria, trouble voiding, or hematuria  Neurological ROS: no TIA or stroke symptoms        Objective:    Vitals:    06/02/17 0912 06/02/17 0929 06/02/17 0956 06/02/17 1040   BP: 118/44 103/43 126/61 124/46   Pulse: 67 72 61 76   Resp: 16      Temp:       SpO2:       Weight:       Height:           PE  Gen:in no acute distress  CV:reg rate  Chest: clear  Abd: soft  Ext/Access: rt IJ tunneled dialysis cath ok     . LAB  Recent Labs      06/01/17   0716   HGB  8.0*   HCT  25.2*     Recent Labs      06/02/17   0714  05/31/17   0701   NA  136  133*   K  4.0  4.8   CL  98  94*   CO2  29  26   GLU  107*  170*   BUN  49*  114*   CREA  3.81*  6.92*   PHOS  4.0*   --    CA  8.3  7.4*           Radiology    A/P:   Patient Active Problem List   Diagnosis Code    Foot ulcer, left ([facility] L97.529    PAD (peripheral artery disease) ([facility]) I73.9    Encephalopathy G93.40    CKD (chronic kidney disease) stage 5, GFR less than 15 ml/min ([facility]) N18.5    Uremia N19    Anemia of chronic renal failure, stage 5 ([facility]) N18.5, D63.1    COPD (chronic obstructive pulmonary disease) ([facility]) J44.9    Morbid obesity ([facility]) E66.01    Hyperkalemia E87.5    DM (diabetes mellitus) ([facility]) E11.9    TONJA on CPAP G47.33, Z99.89    Anasarca R60.1    Acute on chronic kidney failure ([facility]) N17.9, N18.9    Moderate single current episode of major depressive disorder ([facility] F32.1       Pt seen on dialysis for clearance. Ok to go home today. We will follow up at [place] [id number] next week.     Justyna Ramírez MD

## 2017-06-04 PROBLEM — I46.9 CARDIAC ARREST (HCC): Status: ACTIVE | Noted: 2017-01-01

## 2017-06-04 PROBLEM — G93.1 ANOXIC BRAIN DAMAGE (HCC): Status: ACTIVE | Noted: 2017-01-01

## 2017-06-04 PROBLEM — T17.900A: Status: ACTIVE | Noted: 2017-01-01

## 2017-06-04 PROBLEM — T17.920A: Status: ACTIVE | Noted: 2017-01-01

## 2017-06-05 PROBLEM — J96.02 ACUTE HYPERCAPNIC RESPIRATORY FAILURE (HCC): Status: ACTIVE | Noted: 2017-01-01

## 2017-06-05 NOTE — CONSULTS
Palliative Care    Patient: Peewee Britton MRN: [id number]  SSN: [id number]    YOB: 1940  Age: 68 y.o. Sex: male       Date of Request: 6/4/2017  Date of Consult:  6/5/2017  Reason for Consult:  goals of care  Requesting Physician: Dr. Sandra Burns     Assessment/Plan:     Principal Diagnosis:    Altered Mental Status R41.82  Additional Diagnoses:   · Acute Respiratory Failure, Unspecified  J96.00  · Debility, Unspecified  R53.81  · Edema  R60.9  · Frailty  R54  · Counseling, Encounter for Medical Advice  Z71.9  · Encounter for Palliative Care  Z51.5    Palliative Performance Scale (PPS):  PPS: 10    Medical Decision Making:   Reviewed and summarized chart from admission to present. Discussed case with appropriate providers: ICU IDT; primary RN  Reviewed laboratory and x-ray data: CBC, CMP, CXR, ABG    Patient unresponsive on ventilator, wife, son, and son-in-law is at the bedside. Introduced the role of palliative care and reviewed patient's current condition. Listened as wife talked about patient's decline over the past few years, and his suffering over the past months. Wife shares that patient has living will and they have had conversations indicating patient would not want prolonged artifical life support. Current plan is to monitor patient over next 48 hours; if no improvement at that point, will discuss comfort measures. Will continue to follow. Will discuss findings with members of the interdisciplinary team.      Thank you for this referral.   The Palliative Care team is available from 8 am to 4:30 pm Monday-Friday. Medical management during other hours is per the primary attending service. .    Subjective:     History obtained from:  Family, Care Provider and Chart    Chief Complaint: Unresponsive s/p arrest  History of Present Illness:  Mr. Celestino Seymour is a 67 yo male with PMH of ESRD, COPD, DM, chronic pain, depression, debility, and other history as listed below.   Patient experienced witnessed cardiac arrest at home and EMS was summoned. Patient had ROSC, but experienced another arrest in route to hospital requiring CPR. He was intubated and admitted to ICU for further management. Patient unresponsive, likely with anoxic brain injury. Advance Directive: Yes       Code Status:  DNR            Health Care Power of : Yes - Patient states he has a [address] Past Medical History:   Diagnosis Date    Cancer [facility])     prostate    Cellulitis     Chronic kidney disease     Chronic obstructive pulmonary disease (HCC)     Chronic pain     Depression     Diabetes [facility]     Edema     Numbness     knee's down    Thyroid disease     Vitamin D deficiency       Past Surgical History:   Procedure Laterality Date    HX APPENDECTOMY      HX CHOLECYSTECTOMY      HX HERNIA REPAIR      HX PROSTATECTOMY       Family History   Problem Relation Age of Onset    No Known Problems Mother     Stroke Father       Social History   Substance Use Topics    Smoking status: Former Smoker    Smokeless tobacco: Never Used    Alcohol use Not on file     Prior to Admission medications    Medication Sig Start Date End Date Taking? Authorizing Provider   calcium acetate (PHOSLO) 667 mg cap Take 1 Cap by mouth three (3) times daily (with meals). 6/2/17   Faustina Hsu. Amari Bravo MD   escitalopram oxalate (LEXAPRO) 5 mg tablet Take 1 Tab by mouth every evening. Indications: major depressive disorder 6/2/17   Faustina Hsu. Amari Bravo MD   tamsulosin (FLOMAX) 0.4 mg capsule Take 1 Cap by mouth daily. 6/2/17   Faustina Hsu. Amari Bravo MD   metOLazone (ZAROXOLYN) 5 mg tablet Take  by mouth daily. Historical Provider   bumetanide (BUMEX) 2 mg tablet Take 2 mg by mouth two (2) times a day. Historical Provider   budesonide-formoterol (SYMBICORT) 160-4.5 mcg/actuation HFA inhaler Take 2 Puffs by inhalation two (2) times a day.     Historical Provider   levothyroxine (LEVOXYL) 125 mcg tablet Take by mouth Daily (before breakfast). Historical Provider   insulin detemir (LEVEMIR FLEXPEN) 100 unit/mL (3 mL) inpn 10 Units by SubCUTAneous route nightly. Indications: type 2 diabetes mellitus    Historical Provider   calcitRIOL (ROCALTROL) 0.25 mcg capsule Take 0.25 mcg by mouth daily. Historical Provider   aspirin delayed-release 81 mg tablet Take  by mouth daily. Historical Provider   Biotin 2,500 mcg cap Take  by mouth. Historical Provider   acetaminophen-codeine (TYLENOL-CODEINE #3) 300-30 mg per tablet Take 1 Tab by mouth every four (4) hours as needed for Pain. Historical Provider       No Known Allergies     Review of Systems:  Review of systems not obtained due to patient factors: unresponsive     Objective:     Visit Vitals    /58    Pulse (!) 53    Temp 98.1 °F (36.7 °C)    Resp (!) 0    Ht 5' 11\" (1.803 m)    Wt (!) 169.4 kg (373 lb 8 oz)    SpO2 100%    BMI 52.09 kg/m2        Physical Exam:    General:  Debilitated. Unresponsive. Eyes:  Conjunctivae/corneas clear. Pupils 2mm sluggish. Nose: Nares normal. Septum midline. Neck: Supple, symmetrical, trachea midline. Lungs:   Clear to auscultation bilaterally, unlabored. Heart:  Bradycardia. Abdomen:   Obese. Soft, non-tender, non-distended. Extremities: Edema to extremities. Skin: Skin alisia to BLE, scabbed blisters, largest to left foot. Neurologic: Unresponsive. Pupils 2mm, sluggish. Mild myoclonic twitches to face noted. Psych: Unable to assess.       Assessment:     Hospital Problems  Date Reviewed: 5/25/2017          Codes Class Noted POA    Acute hypercapnic respiratory failure ([facility] ICD-10-CM: J96.02  ICD-9-CM: 518.81  6/5/2017 Unknown        Cardiac arrest ([facility] ICD-10-CM: I46.9  ICD-9-CM: 427.5  6/4/2017 Yes        * (Principal)Asphyxia due to foreign body ICD-10-CM: T17.900A  ICD-9-CM: 933.1  6/4/2017 Yes        Anoxic brain damage ([facility] ICD-10-CM: G93.1  ICD-9-CM: 348.1  6/4/2017 Yes Asphyxia by inhalation of food ICD-10-CM: U74.434E  ICD-9-CM: 933.1  6/4/2017 Yes        ESRD on hemodialysis [facility]) ICD-10-CM: N18.6, Z99.2  ICD-9-CM: 585.6, V45.11  6/2/2017 Yes        COPD (chronic obstructive pulmonary disease) ([facility] ICD-10-CM: J44.9  ICD-9-CM: 349  5/28/2017 Yes        Morbid obesity ([facility] ICD-10-CM: E66.01  ICD-9-CM: 278.01  5/28/2017 Yes        DM (diabetes mellitus) ([facility] ICD-10-CM: E11.9  ICD-9-CM: 250.00  5/28/2017 Yes        TONJA on CPAP ICD-10-CM: G47.33, Z99.89  ICD-9-CM: 327.23, V46.8  5/28/2017 Yes              Signed By: Surjit Washington NP     June 5, 2017

## 2017-06-05 NOTE — PROGRESS NOTES
Contacted  for family  Megha Betancourt responded and anointed patient per family request    Cuca Payne III, PhD  Girish Woo  [phone]

## 2017-06-05 NOTE — CDMP QUERY
Please clarify if this patient is being treated/managed for:    ACUTE RESPIRATORY FAILURE WITH HYPERCAPNIA in the setting of asphyxiation by food with ABGs: pH 7.21, pCO2 56, pO2 210, HCO3 22 requiring intubation, removal of hamburger meat from airway, and mechanical ventilation. =>Other Explanation of clinical findings  =>Unable to Determine (no explanation of clinical findings)    The medical record reflects the following:    Risk Factors: asphyxiation by food    Clinical Indicators: ABGs: pH 7.21, pCO2 56, pO2 210, HCO3 22     Treatment:  intubation, removal of hamburger meat from airway, and mechanical ventilation    Please clarify and document your clinical opinion in the progress notes and discharge summary including the definitive and/or presumptive diagnosis, (suspected or probable), related to the above clinical findings. Please include clinical findings supporting your diagnosis.     Thanks,  Roberto Castro RN, [address] [place] [address] Documentation Management Program  [phone]

## 2017-06-05 NOTE — PROGRESS NOTES
Patient went into cardiac arrest;  attended family, who was concerned, but coping appropriately. Patient set to be moved to ICU when possible. Family visited with Patient before leaving for the night. Support provided to family. Katie Parson. Diallo Thompson.

## 2017-06-05 NOTE — ED PROVIDER NOTES
HPI Comments: Patient had a witnessed cardiac arrest at home which they thought might possibly be due to a choking episode as he was eating at the time. Patient was attempted to be intubated in the field but was unsuccessful therefore they placed a Burtis Minor LT and continued to bag him in route to the hospital.  Patient lost his circulation at some point and had CPR in progress when he arrived to the ER. Patient again had return of spontaneous circulation and was brought into the department. Patient is a 68 y.o. male presenting with cardiac arrest. The history is provided by the patient. Cardiac arrest    This is a new problem. The current episode started less than 1 hour ago. The problem has not changed since onset. Past Medical History:   Diagnosis Date    Cancer [facility])     prostate    Cellulitis     Chronic kidney disease     Chronic obstructive pulmonary disease (HCC)     Chronic pain     Depression     Diabetes (HCC)     Edema     Numbness     knee's down    Thyroid disease     Vitamin D deficiency        Past Surgical History:   Procedure Laterality Date    HX APPENDECTOMY      HX CHOLECYSTECTOMY      HX HERNIA REPAIR      HX PROSTATECTOMY           Family History:   Problem Relation Age of Onset    No Known Problems Mother     Stroke Father        Social History     Social History    Marital status:      Spouse name: N/A    Number of children: N/A    Years of education: N/A     Occupational History    Not on file. Social History Main Topics    Smoking status: Former Smoker    Smokeless tobacco: Never Used    Alcohol use Not on file    Drug use: Not on file    Sexual activity: Not on file     Other Topics Concern    Not on file     Social History Narrative         ALLERGIES: Review of patient's allergies indicates no known allergies.     Review of Systems   Unable to perform ROS: Intubated       Vitals:    06/04/17 2150 06/04/17 2155 06/04/17 2200 06/04/17 2205 BP: 130/60 118/53 152/63 152/63   Pulse: 100 87 93 98   Resp: 16 9 26 29   SpO2:   100% 100%   Weight:       Height:                Physical Exam   Constitutional: He is intubated. Morbidly obese   Pulmonary/Chest: He is intubated. [facility] right chest (accessed)   Neurological: He is unresponsive. MDM  Number of Diagnoses or Management Options  Respiratory arrest associated with feeding [facility]): new and requires workup  Diagnosis management comments: Patient had at least 1 additional episode with loss of spontaneous circulation ER and had brief episodes of CPR before regaining pulse again. Electronic medical record reviewed and appears patient recently in the hospital to start dialysis at [facility] placed. Has had some difficulty with breathing and lung issues even at the time of discharge. Patient had to have his Yajaira Blow LT tube exchanged to a regular ET tube. Due to the perceived difficulties prior to starting this procedure, pulmonary was at the bedside with a flexible bronchoscope to assist.  The procedure was completed by the pulmonologist without any difficulty. There was some additional food matter noted in the airway which was removed at the time of tube exchange. Family was updated. Confirmed the patient suspected to have choked by history from family.    ===================================================================  This patient is critically ill and there is a high probability of of imminent or life threatening deterioration in the patient's condition without immediate management. The nature of the patient's clinical problem is: cardiac/respiratory arrest    I have spent 30 minutes in direct patient care, documentation, review of labs/xrays/old records, discussion with Family, Staff, Colleague, Nursing . The time involved in the performance of separately reportable procedures was not counted toward critical care time.      [id number] Keshawn Puga DO; 6/4/2017 @11:46 PM  ===================================================================         Amount and/or Complexity of Data Reviewed  Clinical lab tests: ordered and reviewed      ED Course       Procedures

## 2017-06-05 NOTE — PROGRESS NOTES
Pulmonary/ICU daily Progress Note  Aniket Deutsch    6/5/2017     Patient is a 68 y.o.  male presents with asphyxia and anoxic brain damage. The patient is an unfortunate 19-year-old white male who recently was discharged from the hospital after being established on dialysis due to end-stage renal disease whose other medical problems include hypothyroidism, morbid obesity, COPD, obstructive sleep apnea and chronic lower extremity ulcers along with end-stage renal disease who after eating a hamburger developed shortness of breath followed by cyanosis and cardiac arrest.  EMS was called and arrived at his house in approximately 6 minutes or so as family tells me. CPR was performed and airway could not be established and an esophageal obturator instead used. He was brought to the ER and an root developed cardiac arrest again requiring CPR. He was eventually stabilized. The cuff of the esophageal obturator ruptured and he required fiberoptic intubation which was performed by Dr. Odalis Finney using the disposable bronchoscope with removal of hamburger meat from both his mouth trachea right and left mainstem bronchus. He is currently unresponsive and is being admitted to the intensive care unit for further care. I had a long discussion with the patient's wife and his family 2 sons son-in-law and grandson in regards to the prognosis. The patient had advanced directives and did not wish to be kept alive on artificial machines    Date of Admission:  6/4/2017      Subjective:     Patient is in bed on vent. No change in mental status. On sunday hugger and temp at 96.3 from 94 in AM (Axillary).  On Levaphed      Current Facility-Administered Medications   Medication Dose Route Frequency    DOBUTamine (DOBUTREX) 500 mg/250 mL (2,000 mcg/mL) infusion  5 mcg/kg/min IntraVENous TITRATE    norepinephrine (LEVOPHED) 4 mg in dextrose 5% 250 mL infusion  2-16 mcg/min IntraVENous TITRATE    pantoprazole (PROTONIX) 40 mg in sodium chloride 0.9 % 10 mL injection  40 mg IntraVENous DAILY    sodium chloride (NS) flush 5-10 mL  5-10 mL IntraVENous Q8H    sodium chloride (NS) flush 5-10 mL  5-10 mL IntraVENous PRN    HYDROmorphone (PF) (DILAUDID) injection 0.5 mg  0.5 mg IntraVENous Q4H PRN    insulin regular (NOVOLIN R, HUMULIN R) injection   SubCUTAneous Q6H    [START ON 6/10/2017] levothyroxine (SYNTHROID) injection 75 mcg  75 mcg IntraVENous Q24H    heparin (porcine) injection 5,000 Units  5,000 Units SubCUTAneous Q8H       Objective:     Vitals:    06/05/17 0839 06/05/17 0900 06/05/17 0930 06/05/17 1000   BP:  117/59 104/51 111/58   Pulse: (!) 53  (!) 56 (!) 53   Resp: 19  (!) 0 (!) 0   Temp:       SpO2: 100%  100% 100%   Weight:       Height:       Ventilator Settings  Mode FIO2 Rate Tidal Volume Pressure PEEP   PRVC  60 %    500 ml     10 cm H20      Peak airway pressure: 25.7 cm H2O   Minute ventilation: 10.3 l/min     PHYSICAL EXAM     Gen- the patient is intubated, unresponsive, super morbidly obese. With android central type obesity. HEENT-pupils are equal but nonreactive pinpoint, EOMI, no scleral icterus       nose without alar flaring or epistaxis                  oral [place]a moist without cyanosis  Neck- no JVD or retractions  Lungs-coarse bilaterally with poor air entry  Heart- RRR without M,G,R  Abd- soft and non-tender; with positive bowel sounds. Ext- warm without cyanosis.  There is chronic 2+ edema with stasis dermatitis  Skin- no jaundice or rashes, multiple ecchymosis noted   Neuro-unresponsive, GCS 3    Recent Labs      06/05/17   0510  06/04/17 2119   WBC  26.2*  22.7*   HGB  7.8*  7.9*   HCT  25.0*  25.7*   PLT  212  240     Recent Labs      06/05/17   0510  06/04/17 2119   NA  132*  132*   K  4.0  3.8   CL  93*  92*   GLU  187*  218*   CO2  27  26   BUN  43*  41*   CREA  4.38*  4.45*   MG  2.1  2.3   PHOS  4.7*   --      Recent Labs      06/05/17   0402  06/04/17   2132   PH  7.33*  7.21*   PCO2 47*  56*   PO2  184*  210*   HCO3  24  22     CXR: ETT in place with noted CM and noted some clearing of RUL infiltrates/haziness        Assessment:  (Medical Decision Making)         Hospital Problems  Date Reviewed: 5/25/2017          Codes Class Noted POA    Cardiac arrest [facility]) -- multiple  ICD-10-CM: I46.9  ICD-9-CM: 427.5  6/4/2017 Unknown     due to hypoxia resulting from asphyxia with food     * (Principal)Asphyxia due to foreign body ICD-10-CM: T17.900A  ICD-9-CM: 933.1  6/4/2017 Unknown     multiple particles of hamburger meat were removed from the patient's airway and pharynx. Anoxic brain damage [facility]) ICD-10-CM: G93.1  ICD-9-CM: 348.1  6/4/2017 Unknown     the patient most likely developed anoxic brain damage from prolonged hypoxia. By the time an airway was established at least 20 minutes have elapsed and the patient had 2 episodes of cardiac arrest. See Dr. Ama Guardado note yesterday with family regarding goals of care -- DNR for now. Asphyxia by inhalation of food ICD-10-CM: F76.479F  ICD-9-CM: 933.1  6/4/2017 Unknown     see discussion above     ESRD on hemodialysis [facility]) ICD-10-CM: N18.6, Z99.2  ICD-9-CM: 585.6, V45.11  6/2/2017 Yes     see discussion abovewill be on hold until neuro status can be assessed in the next 48 hours     COPD (chronic obstructive pulmonary disease) ([facility] [address] ICD-10-CM: J44.9  ICD-9-CM: 496  5/28/2017 Yes     does not seem to be exacerbated     Morbid obesity ([facility] ICD-10-CM: E66.01  ICD-9-CM: 278.01  5/28/2017 Yes     significantly complicating patient care     DM (diabetes mellitus) ([facility] ICD-10-CM: E11.9  ICD-9-CM: 250.00  5/28/2017 Yes    Controlled and range noted. TONJA on CPAP ICD-10-CM: G47.33, Z99.89  ICD-9-CM: 327.23, V46.8  5/28/2017 Yes     currently intubated and mechanically ventilated         Acute hypercapnic respiratory failure   --due to the above.      Plan:  (Medical Decision Making)     --See Dr. Ama Guardado note yesterday with family regarding goals of care -- DNR for now.   --continue vent support -- x-ray noted some clearing of RUL s/p bronch and re-intubation. FIO2 decreased to 40%  --holding HD for now  --will order abx given aspiration -- start clindamycin  --ISS  --continue sunday hugger for now, temp is still low.  --artificial tears today. --taper pressors and does have HD access to use as central access per discussion with nursing.   --palliative care to see today    Spoke with family and aware of all of the above (one son and mother). Another son in [place] and daughter went home after being here last night. Cornelio Jo MD    Total critical care time spent with patient and family is 35 minutes today.   \

## 2017-06-05 NOTE — INTERDISCIPLINARY ROUNDS
Interdisciplinary team rounds were held 6/5/2017 with the following team members:Nursing, Nurse Practitioner, Nutrition, Palliative Care, Pastoral Care, Pharmacy, Physician, Respiratory Therapy, Wound Care and Clinical Coordinator and the patient. Plan of care discussed. See clinical pathway and/or care plan for interventions and desired outcomes.

## 2017-06-05 NOTE — PROGRESS NOTES
Pt remains intubated and ETT is secured at the 24 cm david at the lip and on the left side. Breath sounds are coarse wheeze. Trachea is midlineChest excursion is symmetric Sub Q  Negative . Peripheral pulse present yes Edema Positive. Capillary refill 3, skin color Warm, moist.  Vent check completed and all alarms are set and audible, safety rales up, resus bag is at the [facility].

## 2017-06-05 NOTE — ED TRIAGE NOTES
EMS states that patient was witnessed cardiac arrest sp eating a hamburger. States that patient was down at 2010 and that they arrived at 2017 and began ACLS protocol (patient was receiving chest compressions from family prior to EMS arrival). EMS states that they gave patient 3 rounds of epinepherine. Patient noted to have dialysis catheter to right chest and Chen placed.   EMS states that patient was recently discharged from here (hospital) and has had 4 rounds of dialysis

## 2017-06-05 NOTE — PROGRESS NOTES
Physical Therapy Note:    Participated in interdisciplinary rounds including Xiomy, Wound Care, Palliative Care NP, RN staff, MD, Respiratory therapy, and Nutrition. Patient at this time is not following commands, therefore with decreased ability to participate in therapy safely. Will continue to participate in rounds to determine appropriateness of PT/OT.     Thank you,  FLOR LockT

## 2017-06-05 NOTE — PROGRESS NOTES
Patient was intubated with a number 8.0 ET Tube. Tube placement verified by auscultation, by CXR and ETCO2 monitor. ET Tube is secured at the 24 cm david at the teeth and on the bilateral side. Patient was intubated by Dr. Bhupinder Torres on the 2 attempt. Breath sounds are coarse. Patient is Negative for subcutaneous air and chest excursion is symmetric. Trachea is midline. Patient is also Negative for cyanosis and is Negative for pitting edema. Patient placed on ventilator on documented settings. All alarms are set and audible. Resuscitation bag is at the head of the bed.       Ventilator Settings  Mode FIO2 Rate Tidal Volume Pressure PEEP I:E Ratio   PRVC  100 %    500 ml     10 cm H20 (Per Dr. Bhupinder Torres)  1:2.1      Peak airway pressure: 28 cm H2O   Minute ventilation: 12.1 l/min     ABG:   Recent Labs      06/04/17   2132   PH  7.21*   PCO2  56*   PO2  210*   HCO3  22

## 2017-06-05 NOTE — PROGRESS NOTES
Bedside shift report received from [facility]. Patient bradycardic on ventilator. Family to arrive to hospital shortly.

## 2017-06-05 NOTE — PROGRESS NOTES
Spiritual Care Assessment/Progress Notes    Nemiah Lefort [id number]  [id number]    1940  68 y.o.  male    Patient Telephone Number: [phone] (home)   Orthodoxy Affiliation: Unknown   Language: English   Extended Emergency Contact Information  Primary Emergency Contact: Sheila Sanchez  Relation: Daughter  Secondary Emergency Contact: Pernell Muller Phone: [phone]  Relation: Spouse   Patient Active Problem List    Diagnosis Date Noted    Cardiac arrest ([facility] 06/04/2017    Asphyxia due to foreign body 06/04/2017    Anoxic brain damage ([facility] 06/04/2017    Asphyxia by inhalation of food 06/04/2017    [facility] hemod[facility] 06/02/2017    Urinary retention 06/02/2017    Moderate single current episode of major depressive disorder ([facility] 05/31/2017    Anasarca 05/29/2017    Acute on chronic kidney failure ([facility] 05/29/2017    Encephalopathy 05/28/2017    CKD (chronic kidney disease) stage 5, GFR less than 15 ml/min ([facility]) 05/28/2017    Uremia 05/28/2017    Anemia of chronic renal failure, stage 5 ([facility] 05/28/2017    COPD (chronic obstructive pulmonary disease) ([facility] 05/28/2017    Morbid obesity ([facility] 05/28/2017    Hyperkalemia 05/28/2017    DM (diabetes mellitus) ([facility] 05/28/2017    TONJA on CPAP 05/28/2017    Foot ulcer, left ([facility] 05/25/2017    PAD (peripheral artery disease) ([facility] 05/25/2017        Date: 6/5/2017       Level of Orthodoxy/Spiritual Activity:  [x]         Involved in carson tradition/spiritual practice    []         Not involved in carson tradition/spiritual practice  [x]         Spiritually oriented    []         Claims no spiritual orientation    []         seeking spiritual identity  []         Feels alienated from Zoroastrianism practice/tradition  []         Feels angry about Zoroastrianism practice/tradition  [x]         Spirituality/Zoroastrianism tradition is a resource for coping at this time.   []         Not able to assess due to medical condition    Services Provided Today:  []         crisis intervention    []         reading Scriptures  [x]         spiritual assessment    []         prayer  [x]         empathic listening/emotional support  []         rites and rituals (cite in comments)  [x]         life review     []         Moravian support  []         theological development   []         advocacy  []         ethical dialog     []         blessing  []         bereavement support    [x]         support to family  []         anticipatory grief support   []         help with AMD  []         spiritual guidance    []         meditation      Spiritual Care Needs  [x]         Emotional Support  [x]         Spiritual/Anabaptism Care  [x]         Loss/Adjustment  []         Advocacy/Referral                /Ethics  []         No needs expressed at               this time  []         Other: (note in               comments)  Spiritual Care Plan  [x]         Follow up visits with               pt/family  []         Provide materials  []         Schedule sacraments  []         Contact Community               Clergy  [x]         Follow up as needed  []         Other: (note in               comments)     Comments: patient unavailable due to condition  Patient's wife, two of his five children, and one of thirteen grandchildren present at bedside  Patient's wife reflected on patient's character/life narrative  Patient is Orthodox and wife requested that  visit when possible  I have conferred with colleagues in pastoral care and started the process of contacting clergy  Family appreciative of  visits    Sarah Baez

## 2017-06-05 NOTE — H&P
History and Physical/Consult  Misty Rolon    6/4/2017    Date of Admission:  6/4/2017      Subjective:     Patient is a 68 y.o.  male presents with asphyxia and anoxic brain damage. The patient is an unfortunate 41-year-old white male who recently was discharged from the hospital after being established on dialysis due to end-stage renal disease whose other medical problems include hypothyroidism, morbid obesity, COPD, obstructive sleep apnea and chronic lower extremity ulcers along with end-stage renal disease who after eating a hamburger developed shortness of breath followed by cyanosis and cardiac arrest.  EMS was called and arrived at his house in approximately 6 minutes or so as family tells me. CPR was performed and airway could not be established and an esophageal obturator instead used. He was brought to the ER and an root developed cardiac arrest again requiring CPR. He was eventually stabilized. The cuff of the esophageal obturator ruptured and he required fiberoptic intubation which was performed by me using the disposable bronchoscope with removal of hamburger meat from both his mouth trachea right and left mainstem bronchus. He is currently unresponsive and is being admitted to the intensive care unit for further care. I had a long discussion with the patient's wife and his family 2 sons son-in-law and grandson in regards to the prognosis. The patient had advanced directives and did not wish to be kept alive on artificial machines. Prior to Admission Medications   Prescriptions Last Dose Informant Patient Reported? Taking? Biotin 2,500 mcg cap   Yes No   Sig: Take  by mouth. acetaminophen-codeine (TYLENOL-CODEINE #3) 300-30 mg per tablet   Yes No   Sig: Take 1 Tab by mouth every four (4) hours as needed for Pain. aspirin delayed-release 81 mg tablet   Yes No   Sig: Take  by mouth daily.    budesonide-formoterol (SYMBICORT) 160-4.5 mcg/actuation HFA inhaler   Yes No   Sig: Take 2 Puffs by inhalation two (2) times a day. bumetanide (BUMEX) 2 mg tablet   Yes No   Sig: Take 2 mg by mouth two (2) times a day. calcitRIOL (ROCALTROL) 0.25 mcg capsule   Yes No   Sig: Take 0.25 mcg by mouth daily. calcium acetate (PHOSLO) 667 mg cap   No No   Sig: Take 1 Cap by mouth three (3) times daily (with meals). escitalopram oxalate (LEXAPRO) 5 mg tablet   No No   Sig: Take 1 Tab by mouth every evening. Indications: major depressive disorder   insulin detemir (LEVEMIR FLEXPEN) 100 unit/mL (3 mL) inpn   Yes No   Sig: 10 Units by SubCUTAneous route nightly. Indications: type 2 diabetes mellitus   levothyroxine (LEVOXYL) 125 mcg tablet   Yes No   Sig: Take  by mouth Daily (before breakfast). metOLazone (ZAROXOLYN) 5 mg tablet   Yes No   Sig: Take  by mouth daily. tamsulosin (FLOMAX) 0.4 mg capsule   No No   Sig: Take 1 Cap by mouth daily. Facility-Administered Medications: None       Review of Systems  Unobtainable due to patient's clinical condition          Past Medical History:   Diagnosis Date    Cancer [facility])     prostate    Cellulitis     Chronic kidney disease     Chronic obstructive pulmonary disease (HCC)     Chronic pain     Depression     Diabetes (HCC)     Edema     Numbness     knee's down    Thyroid disease     Vitamin D deficiency      Past Surgical History:   Procedure Laterality Date    HX APPENDECTOMY      HX CHOLECYSTECTOMY      HX HERNIA REPAIR      HX PROSTATECTOMY       Social History     Social History    Marital status:      Spouse name: N/A    Number of children: N/A    Years of education: N/A     Occupational History    Not on file.      Social History Main Topics    Smoking status: Former Smoker    Smokeless tobacco: Never Used    Alcohol use Not on file    Drug use: Not on file    Sexual activity: Not on file     Other Topics Concern    Not on file     Social History Narrative     Family History   Problem Relation Age of Onset    No Known Problems Mother     Stroke Father      No Known Allergies    Current Facility-Administered Medications   Medication Dose Route Frequency    DOBUTamine (DOBUTREX) 500 mg/250 mL (2,000 mcg/mL) infusion         Current Outpatient Prescriptions   Medication Sig    calcium acetate (PHOSLO) 667 mg cap Take 1 Cap by mouth three (3) times daily (with meals).  escitalopram oxalate (LEXAPRO) 5 mg tablet Take 1 Tab by mouth every evening. Indications: major depressive disorder    tamsulosin (FLOMAX) 0.4 mg capsule Take 1 Cap by mouth daily.  metOLazone (ZAROXOLYN) 5 mg tablet Take  by mouth daily.  bumetanide (BUMEX) 2 mg tablet Take 2 mg by mouth two (2) times a day.  budesonide-formoterol (SYMBICORT) 160-4.5 mcg/actuation HFA inhaler Take 2 Puffs by inhalation two (2) times a day.  levothyroxine (LEVOXYL) 125 mcg tablet Take  by mouth Daily (before breakfast).  insulin detemir (LEVEMIR FLEXPEN) 100 unit/mL (3 mL) inpn 10 Units by SubCUTAneous route nightly. Indications: type 2 diabetes mellitus    calcitRIOL (ROCALTROL) 0.25 mcg capsule Take 0.25 mcg by mouth daily.  aspirin delayed-release 81 mg tablet Take  by mouth daily.  Biotin 2,500 mcg cap Take  by mouth.  acetaminophen-codeine (TYLENOL-CODEINE #3) 300-30 mg per tablet Take 1 Tab by mouth every four (4) hours as needed for Pain. Objective:     Vitals:    06/04/17 2150 06/04/17 2155 06/04/17 2200 06/04/17 2205   BP: 130/60 118/53 152/63 152/63   Pulse: 100 87 93 98   Resp: 16 9 26 29   SpO2:   100% 100%   Weight:       Height:       Ventilator Settings  Mode FIO2 Rate Tidal Volume Pressure PEEP                        Peak airway pressure:     Minute ventilation:       PHYSICAL EXAM     Gen- the patient is intubated, unresponsive, super morbidly obese. With android central type obesity.     HEENT-pupils are equal but nonreactive pinpoint, EOMI, no scleral icterus       nose without alar flaring or epistaxis oral muscosa moist without cyanosis  Neck- no JVD or retractions  Lungs-coarse bilaterally with poor air entry  Heart- RRR without M,G,R  Abd- soft and non-tender; with positive bowel sounds. Ext- warm without cyanosis.  There is chronic 2+ edema with stasis dermatitis  Skin- no jaundice or rashes, multiple ecchymosis noted   Neuro-unresponsive, GCS 3    Recent Labs      06/04/17 2119   WBC  22.7*   HGB  7.9*   HCT  25.7*   PLT  240     Recent Labs      06/04/17 2119 06/02/17   0714   NA  132*  136   K  3.8  4.0   CL  92*  98   GLU  218*  107*   CO2  26  29   BUN  41*  49*   CREA  4.45*  3.81*   MG  2.3   --    PHOS   --   4.0*     Recent Labs      06/04/17 2132   PH  7.21*   PCO2  56*   PO2  210*   HCO3  22       Assessment:  (Medical Decision Making)     Patient Active Problem List   Diagnosis Code    [facility] L97.529    PAD (peripheral artery disease) ([facility]) I73.9    Encephalopathy G93.40    CKD (chronic kidney disease) stage 5, GFR less than 15 ml/min ([facility]) N18.5    Uremia N19    Anemia of chronic renal failure, stage 5 ([facility]) N18.5, D63.1    COPD (chronic obstructive pulmonary disease) ([facility]) J44.9    Morbid obesity ([facility]) E66.01    Hyperkalemia E87.5    DM (diabetes mellitus) ([facility]) E11.9    TONJA on CPAP G47.33, Z99.89    Anasarca R60.1    Acute on chronic kidney failure ([facility]) N17.9, N18.9    Moderate single current episode of major depressive disorder ([facility] F32.1    ESRD on hemodialysis ([facility]) N18.6, Z99.2    Urinary retention R33.9    Cardiac arrest ([facility] I46.9    Asphyxia due to foreign body T17.900A    Anoxic brain damage ([facility] G93.1    Asphyxia by inhalation of food T17.920A       Plan:  (Medical Decision Making)     Hospital Problems  Date Reviewed: 5/25/2017          Codes Class Noted POA    Cardiac arrest [facility]) ICD-10-CM: I46.9  ICD-9-CM: 427.5  6/4/2017 Unknown     due to hypoxia resulting from asphyxia with food     * (Principal)Asphyxia due to foreign body ICD-10-CM: T17.900A  ICD-9-CM: 933.1  6/4/2017 Unknown     multiple particles of hamburger meat were removed from the patient's airway and pharynx. Anoxic brain damage [facility]) ICD-10-CM: G93.1  ICD-9-CM: 348.1  6/4/2017 Unknown     the patient most likely developed anoxic brain damage from prolonged hypoxia. By the time an airway was established at least 20 minutes have elapsed and the patient had 2 episodes of cardiac arrest.  I discussed the situation with the family at length. Wife told me that the patient did not want to be artificially kept alive. He was therefore made DO NOT RESUSCITATE. I explained to the family that the next 48 hours will be crucial in determining the extent of brain damage. We will provide supportive care with mechanical ventilation, maintain blood pressure and look for neurological recovery in the next 48 hours. Sedatives will not be used unless absolutely necessary in order to assess his neuro function regained. DVT prophylaxis will be provided with heparin. For now nephrology will not be consulted pending assessment of neurological function, if his neurological function is poor at 48 hours the family is likely going to decide on compassionate extubation for the patient. We will engage palliative care. The patient underwent dialysis on Tuesday Wednesday Thursday and Friday and was supposed to get his next dialysis session tomorrow. As mentioned above this will be for now and hold.       Asphyxia by inhalation of food ICD-10-CM: S78.675H  ICD-9-CM: 933.1  6/4/2017 Unknown     see discussion above     ESRD on hemodialysis [facility]) ICD-10-CM: N18.6, Z99.2  ICD-9-CM: 585.6, V45.11  6/2/2017 Yes     see discussion abovewill be on hold until neuro status can be assessed in the next 48 hours     COPD (chronic obstructive pulmonary disease) ([facility] ICD-10-CM: J44.9  ICD-9-CM: 496  5/28/2017 Yes     does not seem to be exacerbated     Morbid obesity ([facility] ICD-10-CM: E66.01  ICD-9-CM: 278.01  5/28/2017 Yes     significantly complicating patient care     DM (diabetes mellitus) ([facility] ICD-10-CM: E11.9  ICD-9-CM: 250.00  5/28/2017 Yes     we will provide sliding scale insulin only, the patient is on chronic insulin therapy at home. TONJA on CPAP ICD-10-CM: G47.33, Z99.89  ICD-9-CM: 327.23, V46.8  5/28/2017 Yes     currently intubated and mechanically ventilated           Dwayne Lofton MD    Total critical care time spent with patient and family excluding intubation procedure 45 minutes.

## 2017-06-05 NOTE — PROCEDURES
The patient underwent fiberoptic intubation and exchange of esophageal obturator due to respiratory failure and hypoxemia as a result of asphyxia with aspiration of food. The disposable Darrold Druze bronchoscope was used to visualize the epiglottis and vocal cords, the esophageal obturator was then removed which allowed us to penetrate through the vocal cords into the trachea were remnants of hamburger meat were found and suctioned these remnants were visible both in the trachea right and left mainstem bronchi. Using the bronchoscope and #8.0 endotracheal tube was inserted into the airway. After assuring proper position above the fredo, the bronchoscope was then used for proper inspection of the airways and using bronchoscopic suction any remnants of food were removed out of the airway successfully. The procedure was terminated without further complication. Patient was not necessary as the patient was completely unresponsive and comatose. Airways were otherwise free of tumor anatomical distortions or excessive secretions    Estimated blood loss:    None    ET tube affixed at the 24th centimeter david at the teeth line level.     Katalina Le MD.

## 2017-06-05 NOTE — PROGRESS NOTES
TRANSFER - IN REPORT:    Verbal report received from Chrys Closs (name) on Didi Blush  being received from ED (unit) for routine progression of care      Report consisted of patients Situation, Background, Assessment and   Recommendations(SBAR). Information from the following report(s) SBAR, Kardex, ED Summary, Procedure Summary, Intake/Output, MAR, Med Rec Status and Cardiac Rhythm NSR was reviewed with the receiving nurse. Opportunity for questions and clarification was provided. Assessment completed upon patients arrival to unit and care assumed. Dual skin assessment performed with Anni Dukes RN. Pt noted to have skin tears to BUE, BLE. Scattered bruising noted. Excoriation to skin folds of abdomen, buttocks. Sacrum red, blanchable. Cream, allevyn applied to sacrum. Pt bathed with CHG wipes. Chen in place. Vent, ET tube in place, well tolerated. Levophed gtt verified. Vs and cardiac monitoring in place.

## 2017-06-06 PROBLEM — R65.21 SEVERE SEPSIS WITH SEPTIC SHOCK (HCC): Status: ACTIVE | Noted: 2017-01-01

## 2017-06-06 PROBLEM — A41.9 SEVERE SEPSIS WITH SEPTIC SHOCK (HCC): Status: ACTIVE | Noted: 2017-01-01

## 2017-06-06 NOTE — PROGRESS NOTES
Ventilator check complete; patient has a #8. 0 ET tube secured at the 24 at the lip. Patient is sedated. Patient is not able to follow commands. Breath sounds are coarse to clear with suctioning. Trachea is midline, Negative for subcutaneous air, and chest excursion is symmetric. Patient is also Negative for cyanosis and is Positive for pitting edema in feet. All alarms are set and audible. Resuscitation bag is at the head of the bed.       Ventilator Settings  Mode FIO2 Rate Tidal Volume Pressure PEEP I:E Ratio   PRVC  35 %    500 ml     10 cm H20  1:2.00      Peak airway pressure: 23 cm H2O   Minute ventilation: 11.8 l/min     ABG:   Recent Labs      06/05/17   0402  06/04/17   2132   PH  7.33*  7.21*   PCO2  47*  56*   PO2  184*  210*   HCO3  24  [facility]

## 2017-06-06 NOTE — PROGRESS NOTES
Hypotensive, increasing levophed drip as needed. Family leaving to go home, assured them they will be called if patient condition worsens.

## 2017-06-06 NOTE — PROGRESS NOTES
Palliative Care Progress Note    Patient: Misty Rolon MRN: [id number]  SSN: [id number]    YOB: 1940  Age: 68 y.o. Sex: male       Assessment/Plan:     Chief Complaint/Interval History: Remains unresponsive on vent     Principal Diagnosis:    · Altered Mental Status R41.82    Additional Diagnoses:   · Acute Respiratory Failure, Unspecified  J96.00  · Debility, Unspecified  R53.81  · Edema  R60.9  · Frailty  R54  · Encounter for Palliative Care  Z51.5    Palliative Performance Scale (PPS)  PPS: 10    Medical Decision Making:   Reviewed and summarized notes over previous 24 hours  Discussed case with appropriate providers: ICU IDT; Mouna Urena, primary RN  Reviewed laboratory and x-ray data: ABG    Patient remains unresponsive on ventilator, no family present. No myoclonic twitches noted today. Patient with no pupillary responses, no spontaneous breaths taken. Per family, they are awaiting for all of patient's children to arrive, then will discuss comfort measures. Will continue to follow. 1- Family meeting held with patient's wife, five children, and multiple other family members at the bedside. All of family verbalize that patient's wishes are to not prolong artificial life support, and family feels that patient's suffering has been prolonged \"long enough\". They would like to proceed with comfort measures today. Discussed with primary RN, Dr. Ines Altman, RT. Orders adjusted for comfort measures. Remained near bedside during ventilator weaning and extubation. Patient with very sparse shallow respirations at first, now apneic and bradycardic. Will discuss findings with members of the interdisciplinary team.         More than 50% of this 35 minute visit was spent counseling and coordination of care as outlined above.     Subjective:     Review of Systems:  Review of systems not obtained due to patient factors: unresponsive     Objective:     Visit Vitals    BP (!) 87/42    Pulse 82    Temp 100.3 °F (37.9 °C)    Resp 20    Ht 5' 11\" (1.803 m)    Wt (!) 174.9 kg (385 lb 8 oz)    SpO2 97%    BMI 53.77 kg/m2       Physical Exam:    General:  Unresponsive on vent. Eyes:  Conjunctivae/corneas clear. Pupils 3mm, non-reactive. Nose: Nares normal. Septum midline. Neck: Supple, symmetrical, trachea midline. Lungs:   Faintly coarse bilaterally, unlabored. Heart:  Irregular rate and rhythm. Abdomen:   Obese. Soft, non-tender, non-distended. High pitch bowel sounds. Extremities: Normal, atraumatic. BLE alisia in color, scabbed blisters/lesions, worst to left foot. Skin: As above. Neurologic: Unresponsive. Psych: Unable to assess.      Signed By: Mariel Rocha NP     June 6, 2017

## 2017-06-06 NOTE — PROGRESS NOTES
Death Note    Elana Mcgee  Admission date:  2017    Admitting Diagnosis:  Asphyxia by inhalation of food, initial encounter    Called to evaluate patient who was found by nursing to have . On arrival patient was found to be unresponsive. Physical exam was performed and the patient was noted to be apneic, asystolic, pupils were fixed and dilated, with absent occulocephalic reflex. Patient pronounced dead at 14:35 on 17. Cause of death felt to be asphyxia.     Mikanguyen Kan MD

## 2017-06-06 NOTE — PROGRESS NOTES
Care Daily Progress Note: 6/6/2017  Admission Date: 6/4/2017     The patient's chart is reviewed and the patient is discussed with the staff. 68yo M admitted after choking on hamburger meat, prolonged hypoxia and cardia arrest now with concern for anoxic brain injury. Intubated and on pressor support. Family gathering to discuss goals. Subjective: This morning the patient is unresponsive. Remains intubated. On pressor support with levophed 16 and vaso 0.04. Daughter at bedside and states reset of family should be here later today.      Current Facility-Administered Medications   Medication Dose Route Frequency    DOBUTamine (DOBUTREX) 500 mg/250 mL (2,000 mcg/mL) infusion  5 mcg/kg/min IntraVENous TITRATE    pantoprazole (PROTONIX) 40 mg in sodium chloride 0.9 % 10 mL injection  40 mg IntraVENous DAILY    polyvinyl alcohol (LIQUIFILM TEARS) 1.4 % ophthalmic solution 1 Drop  1 Drop Both Eyes PRN    clindamycin phosphate (CLEOCIN) 600 mg in 0.9% sodium chloride (MBP/ADV) 100 mL ADV  600 mg IntraVENous Q8H    LORazepam (ATIVAN) injection 1 mg  1 mg IntraVENous Q4H PRN    glycopyrrolate (ROBINUL) injection 0.2 mg  0.2 mg IntraVENous Q8H PRN    nystatin (MYCOSTATIN) 100,000 unit/gram powder   Topical PRN    NOREPINephrine (LEVOPHED) 4,000 mcg in dextrose 5% 250 mL infusion  2-16 mcg/min IntraVENous TITRATE    vasopressin (VASOSTRICT) 100 Units in 0.9% sodium chloride 100 mL infusion  0.01-0.04 Units/min IntraVENous TITRATE    sodium chloride (NS) flush 5-10 mL  5-10 mL IntraVENous Q8H    sodium chloride (NS) flush 5-10 mL  5-10 mL IntraVENous PRN    HYDROmorphone (PF) (DILAUDID) injection 0.5 mg  0.5 mg IntraVENous Q4H PRN    insulin regular (NOVOLIN R, HUMULIN R) injection   SubCUTAneous Q6H    [START ON 6/10/2017] levothyroxine (SYNTHROID) injection 75 mcg  75 mcg IntraVENous Q24H    heparin (porcine) injection 5,000 Units  5,000 Units SubCUTAneous Q8H       Review of Systems  Unobtainable due to patient status. Objective:     Vitals:    06/06/17 0801 06/06/17 0816 06/06/17 0831 06/06/17 0848   BP: (!) 87/46 (!) 86/44 (!) 87/42    Pulse: 70 83 85 82   Resp: 20 20 20 20   Temp:   100.3 °F (37.9 °C)    SpO2: 94% 94% 94% 97%   Weight:       Height:           Intake and Output:   06/04 1901 - 06/06 0700  In: 329.6 [I.V.:329.6]  Out: 375 [Urine:375]       Physical Exam:          Constitutional:  intubated and mechanically ventilated. EENMT:  Sclera clear, pupils equal, oral mucosa moist  Respiratory: Intubated, CTA B, no w/r/r. Cardiovascular:  RRR with no M,G,R;  Gastrointestinal:  soft with no tenderness; positive bowel sounds present  Musculoskeletal:  warm with no cyanosis, no lower leg edema  Skin:  no jaundice or ecchymosis  Neurologic: no movement seen. Psychiatric:  [place]. LINES:  ETT, [place]    DRIPS:  Levophed, vasopressin. CHEST XRAY:    6/5/17  Slight improved aeration in the right upper lung with persistent bilateral  Infiltrates/edema.       Ventilator Settings  Mode FIO2 Rate Tidal Volume Pressure PEEP   PRVC  45 %    500 ml     10 cm H20      Peak airway pressure: 25.6 cm H2O   Minute ventilation: 9.5 l/min     ABG:   Recent Labs      06/06/17   0301  06/05/17   0402  06/04/17   2132   PH  7.32*  7.33*  7.21*   PCO2  45  47*  56*   PO2  75  184*  210*   HCO3  23  24  22        LAB  No lab exists for component: [place]  Recent Labs      06/05/17   0510  06/04/17 2119   WBC  26.2*  22.7*   HGB  7.8*  7.9*   HCT  25.0*  25.7*   PLT  212  240     Recent Labs      06/05/17   0510  06/04/17 2119   NA  132*  132*   K  4.0  3.8   CL  93*  92*   CO2  27  26   GLU  187*  218*   BUN  43*  41*   CREA  4.38*  4.45*   MG  2.1  2.3   PHOS  4.7*   --    CA  8.4  8.2*   ALB  2.3*  2.3*   SGOT  42*  25       Assessment:  (Medical Decision Making)     Hospital Problems  Date Reviewed: 5/25/2017          Codes Class Noted POA    Severe sepsis with septic shock ([facility] ICD-10-CM: A41.9, R65.21  ICD-9-CM: 038.9, 995.92, 785.52  6/6/2017 Unknown        Acute hypercapnic respiratory failure (HCC) ICD-10-CM: J96.02  ICD-9-CM: 518.81  6/5/2017 Unknown        Cardiac arrest ([facility] ICD-10-CM: I46.9  ICD-9-CM: 427.5  6/4/2017 Yes        * (Principal)Asphyxia due to foreign body ICD-10-CM: T17.900A  ICD-9-CM: 933.1  6/4/2017 Yes        Anoxic brain damage ([facility] ICD-10-CM: G93.1  ICD-9-CM: 348.1  6/4/2017 Yes        Asphyxia by inhalation of food ICD-10-CM: C10.742M  ICD-9-CM: 933.1  6/4/2017 Yes        ESRD on hemodialysis [facility]) ICD-10-CM: N18.6, Z99.2  ICD-9-CM: 585.6, V45.11  6/2/2017 Yes        COPD (chronic obstructive pulmonary disease) ([facility] ICD-10-CM: J44.9  ICD-9-CM: 927  5/28/2017 Yes        Morbid obesity ([facility] ICD-10-CM: E66.01  ICD-9-CM: 278.01  5/28/2017 Yes        DM (diabetes mellitus) ([facility] ICD-10-CM: E11.9  ICD-9-CM: 250.00  5/28/2017 Yes        TONJA on CPAP ICD-10-CM: G47.33, Z99.89  ICD-9-CM: 327.23, V46.8  5/28/2017 Yes            Patient admitted after choking on food with cardiac arrest, hypoxic brain injury, and now evidence of severe sepsis with septic shock on high doses of 2 pressors. Patient is gravely ill and chances of recovery extremely low. Family is gathering and there have been indications to nursing that may want to pursue comfort care. Plan:  (Medical Decision Making)     --Continue current level of ICU care, ongoing vent support, pressor support. Will bolus IVF as needed to help maintain BP  --maximoy only on clinda. If family indicates that they want to continue aggressive support will need to broaden these abx and obtain blood cultures, and consult nephrology for HD support. Will hold until family meeting. Critically ill with total critical care time: 30 minutes. Total critical care time spent 40min.     Joe Rock MD

## 2017-06-06 NOTE — PROGRESS NOTES
Physical Therapy Note:    Participated in interdisciplinary rounds including Xiomy, Wound Care, Palliative Care NP, RN staff, MD, Respiratory therapy, and Nutrition. Patient at this time is not following commands, therefore with decreased ability to participate in therapy safely. Will continue to participate in rounds to determine appropriateness of PT/OT.     Thank you,  Hunter Valero, PT, DPT

## 2017-06-06 NOTE — INTERDISCIPLINARY ROUNDS
.Interdisciplinary team rounds were held 6/6/2017 with the following team members:Care Management, Nursing, Nurse Practitioner, Nutrition, Palliative Care, Pastoral Care, Pharmacy, Physical Therapy, Physician, Respiratory Therapy, Wound Care and Clinical Coordinator. Plan of care discussed. See clinical pathway and/or care plan for interventions and desired outcomes.

## 2017-06-06 NOTE — PROGRESS NOTES
Patient is intubated without sedation. Patient does not follow any commands and does not respond to any stimulus. Pupils are 4mm, equal and fixed. Patient is in a.fib, BP maintained on levophed at 16mcg/min and vasopressin 0.04units/min. Bilateral breath sounds are clear and diminished. ETT, 24 cm, FiO2 45%. Abdomen is obese and semi-soft with hypoactive bowel sounds x4. Patient has davenport in place, but is oliguric. Profound generalized muscle weakness to all extremities. Cap refill less than 3 seconds and pulses palpable to all extremities. Edema +1 and non-pitting BUE and +3, pitting to BLE. Patient has scattered bruises to BUE, Excoriation under left abdominal folds and the genital area, skin tear to the buttocks, dressing present to the greater right toe, and a wound to the left foot. No other skin issues noted. VSS, NAD.

## 2017-06-06 NOTE — PROGRESS NOTES
Bedside shift change report given to Kristie Munoz and Katharine RN (oncoming nurse) by Rinku Cristina (offgoing nurse). Report included the following information SBAR, Kardex, ED Summary, OR Summary, Procedure Summary, Intake/Output, Recent Results and Cardiac Rhythm a. fib. Patient turned and skin assessed.

## 2017-06-06 NOTE — PROGRESS NOTES
Call to inform Dr. Leah Giles of patient's hypotension and rate of levophed. Order obtained for vasopressin.

## 2017-06-06 NOTE — PROGRESS NOTES
Spiritual Care visit. Follow up visit. Patient was about to be made \"comfort measures only. \" Family requested  to come and pray with them. This  responded to the request.  Large number of family members: Patient's wife, adult children and grandchildren were present. All joined hands around the bed and in prayer.     Visit by Summer Payan M.Ed., Th.B. ,Staff

## 2017-06-06 NOTE — PROGRESS NOTES
Report received. Chart reviewed. Bedside assessment completed. Patient repositioned. Levophed drip at 8 mcg/min. Systolic blood pressure 66'E, MAP = 63. Family at bedside.

## 2017-06-06 NOTE — DISCHARGE SUMMARY
Death Summary    [facility]  Admission date:  6/4/2017  Discharge date:  6/6/17    Admitting Diagnosis:  Asphyxia by inhalation of food, initial encounter  Discharge Diagnosis:    Problem List as of 6/6/2017  Date Reviewed: 5/25/2017          Codes Class Noted - Resolved    Severe sepsis with septic shock [facility]) ICD-10-CM: A41.9, R65.21  ICD-9-CM: 038.9, 995.92, 785.52  6/6/2017 - Present        Acute hypercapnic respiratory failure ([facility] ICD-10-CM: J96.02  ICD-9-CM: 518.81  6/5/2017 - Present        Cardiac arrest ([facility] ICD-10-CM: I46.9  ICD-9-CM: 427.5  6/4/2017 - Present        * (Principal)Asphyxia due to foreign body ICD-10-CM: T17.900A  ICD-9-CM: 933.1  6/4/2017 - Present        Anoxic brain damage (HCC) ICD-10-CM: G93.1  ICD-9-CM: 348.1  6/4/2017 - Present        Asphyxia by inhalation of food ICD-10-CM: C14.053I  ICD-9-CM: 933.1  6/4/2017 - Present        ESRD on hemodialysis ([facility] ICD-10-CM: N18.6, Z99.2  ICD-9-CM: 585.6, V45.11  6/2/2017 - Present        Urinary retention ICD-10-CM: R33.9  ICD-9-CM: 788.20  6/2/2017 - Present    Overview Signed 6/2/2017  1:56 PM by Faustina Bravo MD     800 ml drained in ED with davenport catheter placement.              Moderate single current episode of major depressive disorder ([facility] ICD-10-CM: F32.1  ICD-9-CM: 296.22  5/31/2017 - Present        Anasarca ICD-10-CM: R60.1  ICD-9-CM: 782.3  5/29/2017 - Present        Acute on chronic kidney failure (HCC) ICD-10-CM: N17.9, N18.9  ICD-9-CM: 584.9, 585.9  5/29/2017 - Present        Encephalopathy ICD-10-CM: G93.40  ICD-9-CM: 348.30  5/28/2017 - Present        CKD (chronic kidney disease) stage 5, GFR less than 15 ml/min ([facility]) ICD-10-CM: N18.5  ICD-9-CM: 585.5  5/28/2017 - Present        Uremia ICD-10-CM: N19  ICD-9-CM: 088  5/28/2017 - Present        Anemia of chronic renal failure, stage 5 (HCC) ICD-10-CM: N18.5, D63.1  ICD-9-CM: 285.21, 585.5  5/28/2017 - Present        COPD (chronic obstructive pulmonary disease) [facility]) ICD-10-CM: J44.9  ICD-9-CM: 903  5/28/2017 - Present        Morbid obesity ([facility] ICD-10-CM: E66.01  ICD-9-CM: 278.01  5/28/2017 - Present        Hyperkalemia ICD-10-CM: E87.5  ICD-9-CM: 276.7  5/28/2017 - Present        DM (diabetes mellitus) ([facility] ICD-10-CM: E11.9  ICD-9-CM: 250.00  5/28/2017 - Present        TONJA on CPAP ICD-10-CM: G47.33, Z99.89  ICD-9-CM: 327.23, V46.8  5/28/2017 - Present        Foot ulcer, left ([facility] ICD-10-CM: D44.815  ICD-9-CM: 707.15  5/25/2017 - Present        PAD (peripheral artery disease) ([facility] ICD-10-CM: I73.9  ICD-9-CM: 443.9  5/25/2017 - Present        RESOLVED: A-fib ([facility] ICD-10-CM: I48.91  ICD-9-CM: 427.31  5/28/2017 - 5/28/2017              Consultants:    Palliative care    Studies/Procedures:  6/4/17  Endotracheal tube tip in the mid trachea. Right upper lobe and left  lower lobe consolidation. Hospital course:  Admission:   The patient is an unfortunate 80-year-old white male who recently was discharged from the hospital after being established on dialysis due to end-stage renal disease whose other medical problems include hypothyroidism, morbid obesity, COPD, obstructive sleep apnea and chronic lower extremity ulcers along with end-stage renal disease who after eating a hamburger developed shortness of breath followed by cyanosis and cardiac arrest. EMS was called and arrived at his house in approximately 6 minutes or so as family tells me. CPR was performed and airway could not be established and an esophageal obturator instead used. He was brought to the ER and an root developed cardiac arrest again requiring CPR. He was eventually stabilized. The cuff of the esophageal obturator ruptured and he required fiberoptic intubation which was performed by me using the disposable bronchoscope with removal of hamburger meat from both his mouth trachea right and left mainstem bronchus.  He is currently unresponsive and is being admitted to the intensive care unit for further care. I had a long discussion with the patient's wife and his family 2 sons son-in-law and grandson in regards to the prognosis. The patient had advanced directives and did not wish to be kept alive on artificial machines. Interval:  The patient was admitted to the ICU and maintained on vent support with the addition of pressors. He was started on clindamycin for possible aspiration pneumonia. Palliative care was consulted to help with decision making. Family indicated that they would like to be able to gather family members who were out of state. This occurred on 6/6/17 and it was decided to compassionately withdraw life sustaining measures. The patient was extubated and passed soon after. Final:  --Pronounced dead at 14:35. --Total discharge greater than 30 minutes in duration.     Joe Rock MD

## 2017-06-06 NOTE — PROGRESS NOTES
Ventilator check complete; patient has a #8. 0 ET tube secured at the 24 at the lip. Patient is not sedated. Patient is not able to follow commands. Breath sounds are diminished. Trachea is midline, Negative for subcutaneous air, and chest excursion is symmetric. Patient is also Negative for cyanosis and is Positive for pitting edema. All alarms are set and audible. Resuscitation bag is at the head of the bed.       Ventilator Settings  Mode FIO2 Rate Tidal Volume Pressure PEEP I:E Ratio   PRVC  45 %  20  500 ml     10 cm H20  1:2.00      Peak airway pressure: 25.6 cm H2O   Minute ventilation: 9.5 l/min     ABG:   Recent Labs      06/06/17   0301  06/05/17   0402  06/04/17   2132   PH  7.32*  7.33*  7.21*   PCO2  45  47*  56*   PO2  75  184*  210*   HCO3  23  24  22         Sima Degroot, RT

## 2017-06-06 NOTE — PROGRESS NOTES
Vasopressin drip  increased to  0.04 units/hr, levophed drip at 16 mcg/min, blood pressure 69'R systolic. Attempted to call phone numbers on demographic sheet to update family but did not get an answer.

## 2017-06-06 NOTE — PROGRESS NOTES
Patient  with a large group of family members at bedside grieving appropriately. Family said that they did not need anything, that the patient was at peace and so were they. The family shared that another , Chey Bingham, had visited with them approximately 45 minutes before and had prayed a prayer. The family had no further needs.       [employer]

## 2017-06-06 NOTE — PROGRESS NOTES
Patient compassionately extubated to room air. Patient premedicated with Ativan and Dilaudid. RN and RT at bedside. Emotional support provided for the family.  notified.

## 2017-06-06 NOTE — PROGRESS NOTES
Spiritual Care visit. Follow up visit. Patient was about to be made \"comfort measures only. \" Family requested  to come and pray with them. This  responded to the request.  Large number of family members: Patient's wife, adult children and grandchildren were present. All joined hands around the bed and in prayer.     Visit by Anum Underwood M.Ed., Th.B. ,Staff

## 2017-06-06 NOTE — PROGRESS NOTES
Report to AM nurses. Bedside assessment of skin done. Repositioned for comfort. Remains unresponsive.